# Patient Record
Sex: FEMALE | Race: WHITE | Employment: FULL TIME | URBAN - NONMETROPOLITAN AREA
[De-identification: names, ages, dates, MRNs, and addresses within clinical notes are randomized per-mention and may not be internally consistent; named-entity substitution may affect disease eponyms.]

---

## 2019-12-07 ENCOUNTER — OFFICE VISIT (OUTPATIENT)
Dept: FAMILY MEDICINE CLINIC | Age: 59
End: 2019-12-07
Payer: COMMERCIAL

## 2019-12-07 VITALS
TEMPERATURE: 97.1 F | DIASTOLIC BLOOD PRESSURE: 80 MMHG | SYSTOLIC BLOOD PRESSURE: 134 MMHG | HEIGHT: 67 IN | OXYGEN SATURATION: 96 % | HEART RATE: 99 BPM | WEIGHT: 293 LBS | BODY MASS INDEX: 45.99 KG/M2

## 2019-12-07 DIAGNOSIS — J40 BRONCHITIS: ICD-10-CM

## 2019-12-07 DIAGNOSIS — J01.90 ACUTE SINUSITIS, RECURRENCE NOT SPECIFIED, UNSPECIFIED LOCATION: Primary | ICD-10-CM

## 2019-12-07 PROCEDURE — 99213 OFFICE O/P EST LOW 20 MIN: CPT | Performed by: FAMILY MEDICINE

## 2019-12-07 RX ORDER — ALBUTEROL SULFATE 90 UG/1
2 AEROSOL, METERED RESPIRATORY (INHALATION) 4 TIMES DAILY PRN
Qty: 1 INHALER | Refills: 0 | Status: SHIPPED | OUTPATIENT
Start: 2019-12-07

## 2019-12-07 RX ORDER — PREDNISONE 2.5 MG
TABLET ORAL
Refills: 0 | COMMUNITY
Start: 2019-09-16 | End: 2022-03-03

## 2019-12-07 RX ORDER — PREDNISONE 20 MG/1
TABLET ORAL
Qty: 15 TABLET | Refills: 0 | Status: SHIPPED | OUTPATIENT
Start: 2019-12-07 | End: 2022-03-03

## 2019-12-07 RX ORDER — CEFDINIR 300 MG/1
300 CAPSULE ORAL 2 TIMES DAILY
Qty: 20 CAPSULE | Refills: 0 | Status: SHIPPED | OUTPATIENT
Start: 2019-12-07 | End: 2019-12-17

## 2019-12-07 RX ORDER — BUSPIRONE HYDROCHLORIDE 10 MG/1
10 TABLET ORAL NIGHTLY
Refills: 0 | COMMUNITY
Start: 2019-09-18 | End: 2022-03-03

## 2021-10-19 ENCOUNTER — HOSPITAL ENCOUNTER (OUTPATIENT)
Dept: WOUND CARE | Age: 61
Discharge: HOME | End: 2021-10-19

## 2021-10-22 ENCOUNTER — TELEPHONE (OUTPATIENT)
Dept: BARIATRICS/WEIGHT MGMT | Age: 61
End: 2021-10-22

## 2021-10-22 NOTE — TELEPHONE ENCOUNTER
Pt had cx appt due to medical conditions going on, I called pt back.  She wants to make an appt but still has a new medical problem going on, pt to call back when ready to R/S

## 2021-11-24 ENCOUNTER — HOSPITAL ENCOUNTER (OUTPATIENT)
Age: 61
Discharge: HOME | End: 2021-11-26

## 2021-11-24 PROCEDURE — 87081 CULTURE SCREEN ONLY: CPT

## 2021-11-26 LAB — MRSA CULTURE ONLY: NORMAL

## 2022-03-03 ENCOUNTER — OFFICE VISIT (OUTPATIENT)
Dept: BARIATRICS/WEIGHT MGMT | Age: 62
End: 2022-03-03
Payer: COMMERCIAL

## 2022-03-03 VITALS
HEART RATE: 71 BPM | BODY MASS INDEX: 50.02 KG/M2 | TEMPERATURE: 97.2 F | DIASTOLIC BLOOD PRESSURE: 70 MMHG | WEIGHT: 293 LBS | HEIGHT: 64 IN | SYSTOLIC BLOOD PRESSURE: 128 MMHG

## 2022-03-03 DIAGNOSIS — R53.82 CHRONIC FATIGUE: Primary | ICD-10-CM

## 2022-03-03 DIAGNOSIS — E66.01 CLASS 3 SEVERE OBESITY DUE TO EXCESS CALORIES WITH SERIOUS COMORBIDITY AND BODY MASS INDEX (BMI) GREATER THAN OR EQUAL TO 70 IN ADULT (HCC): ICD-10-CM

## 2022-03-03 PROBLEM — E66.813 CLASS 3 SEVERE OBESITY DUE TO EXCESS CALORIES WITH SERIOUS COMORBIDITY AND BODY MASS INDEX (BMI) GREATER THAN OR EQUAL TO 70 IN ADULT: Status: ACTIVE | Noted: 2022-03-03

## 2022-03-03 PROCEDURE — 99202 OFFICE O/P NEW SF 15 MIN: CPT

## 2022-03-03 PROCEDURE — 99204 OFFICE O/P NEW MOD 45 MIN: CPT | Performed by: INTERNAL MEDICINE

## 2022-03-03 RX ORDER — FLUOXETINE HYDROCHLORIDE 20 MG/1
20 CAPSULE ORAL DAILY
COMMUNITY
Start: 2022-02-22 | End: 2022-07-18

## 2022-03-03 RX ORDER — PHENTERMINE HYDROCHLORIDE 37.5 MG/1
37.5 TABLET ORAL
Qty: 30 TABLET | Refills: 0 | Status: SHIPPED | OUTPATIENT
Start: 2022-03-03 | End: 2022-03-24 | Stop reason: ALTCHOICE

## 2022-03-03 RX ORDER — ALBUTEROL SULFATE 2.5 MG/3ML
2.5 SOLUTION RESPIRATORY (INHALATION) DAILY
COMMUNITY
Start: 2022-01-06

## 2022-03-03 RX ORDER — PRAZOSIN HYDROCHLORIDE 1 MG/1
CAPSULE ORAL NIGHTLY
COMMUNITY
Start: 2021-12-27 | End: 2022-03-24 | Stop reason: ALTCHOICE

## 2022-03-03 RX ORDER — CLOTRIMAZOLE 1 %
CREAM (GRAM) TOPICAL PRN
COMMUNITY
Start: 2021-12-02 | End: 2022-07-18

## 2022-03-03 RX ORDER — OMEPRAZOLE 10 MG/1
CAPSULE, DELAYED RELEASE ORAL PRN
COMMUNITY
Start: 2021-11-24 | End: 2022-10-31 | Stop reason: DRUGHIGH

## 2022-03-03 NOTE — PATIENT INSTRUCTIONS
Protein: >65 gm/day. Fiber: >=25 gm/day. Water: >96 oz/day. Breakfast -     one high protein shake                            + 20 grams of fiber. Do this by either eating 12 tablespoons of the original, plain Fiber One cereal every day or 4 tablespoons of wheat dextrin powder (Benefiber or a generic brand), or fiber gummies (5 g on 2 gummies) 8 gummies every day. Work up to this amount slowly by starting with only one-eighth to one-fourth of the target amount and then adding another one-eighth to one-fourth every one or two weeks until reaching the target. Lunch -           one high protein shake                           + one a fat snack item     Dinner -          one frozen meal                           + one snack item     Shake options (<200 diya, >25 grams/protein) :  Nectar, Pure Protein, Premier, Boost Max, Ensure Max, BeneProtein and [employer] (which is lactose-free) are milk-based options; Nectar, Premier Protein Clear, IsoPure Protein Drink, and Protein 2 O are water-based options; (Premier Protein Clear, the water-based option, comes in a 20 oz bottle with 20 grams of protein and 90 calories. So you have to drink three each day which increases the cost.)  (Disclaimer: Dietary supplements rarely have their listed ingredients and the amount of each verified by a third party other. Sometimes they give verification for their claims to be GMO and gluten free and to be organic.  However, even such verifications as these may still be untrustworthy.)     Fat snack options (<150 diya, >11 grams of fat): 22 almonds, 1 1/2 tablespoon of a oil-based dressing or 4 tablespoons of [place] dressing on a bed of salad greens, 1 1/2 tablespoons of peanut butter, 1 Cranberry [place] Geliyoo options (<100 diya, no sweets): fruit, low fat/high protein [place] yogurt, mozzarella cheese stick, nuts, salad with dressing, peanut butter, chips/crackers/pretzels     Frozen meal options (<350 diya):  Weight Watchers Smart Ones, [employer] Cuisine, Healthy Choice, Vernell's, Estefany's     Food substitutes for the shakes:  4 oz of baked, grilled or broiled chicken, turkey or fish, 10 egg whites     Take a multivitamin daily. Walk 30 min every day or equivalent (210 min/week). Phentermine:  Take phentermine 37.5 mg, one-half to one tablet daily as needed for appetite suppression. Take each dose 30-90 min before effect will be needed. While taking phentermine, check the Blood Pressure every morning and every evening. If the systolic BP is >414 mmHg, the diastolic BP is >30 mm/Hg or the heart rate is > 100 beats per minute, do not take phentermine that day. If the systolic BP is consistently >155 mmHg, the diastolic BP is consistently above 90 mm/Hg or the heart rate is consistently > 100 beats per minute, then stop taking phentermine altogether. If the systolic BP >860 mmHg or the diastolic BP is >928 mmHg (even if it is only once), then phentermine should be stopped altogether without proving that any of these are consistently elevated. Follow up in 1 month.

## 2022-03-03 NOTE — PROGRESS NOTES
CC -   Referred for: HLD, obesity, tiredness. BACKGROUND -     First visit: 3/3/22      Obesity (all weight in lbs)  Began after college  Initial BMI 72.73, Wt 420.4, Ht 63.75\"  HS Grad wt patient unsure (was normal weight)   Lowest   wt 155 (30s)   Highest  wt 420.4  Pattern of wt gain: gradual  Wt change past yr: about the same  Most wt lost: 65 lbs (2017-18)  Other diets attempted: modified fast protein sparing diet ([facility])    Desire to lose weight: 10/10    Initial Diet:    Number of meals per day - 1 (dinner)    Number of snacks per day - 4    Meal volume - 9-10\" plate,  occasional seconds    Fast food/convenience store - 2x/week    Restaurants (not fast food) - 1x/week   Sweets - 0d/week (occasional cake)   Chips - 0d/week   Crackers/pretzels - 0d/week   Nuts - 0d/week   Peanut Butter - 0-1d/week   Popcorn - 0d/week   Dried fruit - 0d/week   Whole fruit - 5d/week (about 5 fruits a week)   Breakfast cereal - 0d/week   Granola/Protein/Energy bar - 0d/week   Sugar sweetened beverages - water mainly   Protein - No supplements   Fiber - No supplements     Initial Exercise:    Gym membership - no    Walking - no    Running - no    Resistance - no    Aerobic class - no  Has swim spa, and can walk against the current (currently has ) - will start once wound heals. Initial Sleep: Bedtime: 10pm-2am, wake up time: 1.5 hrs of sleep, and then wakes up and then 1-2 hrs, daytime naps: no. Has CPAP machine that was recalled but has not had replaced. Weight scale at home: yes, takes weight: daily  Food scale: yes.  ______________________    [facility] -  Past Medical History:   Diagnosis Date    Arthritis     Asthma     RENETTA (generalized anxiety disorder)     Meningitis due to viruses     Pancreatitis    Pancreatitis was a reaction to synvisc injected in right knee.     Past Surgical History:   Procedure Laterality Date    APPENDECTOMY      CHOLECYSTECTOMY      [facility]    [facility] CURETTAGE OF UTERUS      [facility]    ENDOSCOPY, COLON, DIAGNOSTIC      [facility]  [facility]/12/2017    [facility]    SKIN BIOPSY      breast   Pilonidal cyst removal (had MRSA)    Prior to Admission medications    Medication Sig Start Date End Date Taking? Authorizing Provider   predniSONE (DELTASONE) 2.5 MG tablet  9/16/19   Historical Provider, MD   busPIRone (BUSPAR) 10 MG tablet Take 10 mg by mouth nightly  9/18/19   Historical Provider, MD   predniSONE (DELTASONE) 20 MG tablet 1 po bid x 5 days then 1 po daily x 5 days 12/7/19   Axel Pascual MD   albuterol sulfate  (90 Base) MCG/ACT inhaler Inhale 2 puffs into the lungs 4 times daily as needed for Wheezing 12/7/19   Axel Pascual MD   acetaminophen (TYLENOL) 325 MG tablet Take 650 mg by mouth every 6 hours as needed for Pain    Historical Provider, MD   Ascorbic Acid (VITAMIN C) 500 MG tablet Take 1,000 mg by mouth daily    Historical Provider, MD   vitamin B-12 (CYANOCOBALAMIN) 100 MCG tablet Take 50 mcg by mouth daily    Historical Provider, MD   Multiple Vitamins-Minerals (THERAPEUTIC MULTIVITAMIN-MINERALS) tablet Take 1 tablet by mouth daily    Historical Provider, MD   Also takes vitamin D and E. Senokot-s for constipation (also benefiber). Probiotic(peptiva) daily. Family history: DM: no, Heart disease: father, MGM. Allergies: Allergies   Allergen Reactions    Latex     Adhesive Tape     Gabapentin Diarrhea     constant diarrhea    Pcn [Penicillins]     Sulfa Antibiotics      Social history: smoking: never ; Alcohol: rarely. ROS -  Card - no CP, but AMOS on exertion. GI - no N/V/D, has constipation.     PE -  Gen : /70 (Site: Left Lower Arm, Position: Sitting, Cuff Size: Medium Adult)   Pulse 71   Temp 97.2 °F (36.2 °C) (Temporal)   Ht 5' 3.75\" (1.619 m)   Wt (!) 420 lb 6.4 oz (190.7 kg)   BMI 72.73 kg/m²       WN, WD, NAD  Lung: Nml resp effort, CTA B/L  Heart:  RRR w/ 1/6 SM in left sternal border, no LE pitting edema b/l  Psych: Normal mood   Full affect  Neuro: Moves all ext well  ______________________    HISTORY & ASSESSMENT/PLAN -     Problem 1  - Fatigue   HPI   - Ongoing, gradually progressing which pt feels like due to weight gain  Assessment  - Uncontrolled   Plan   - Has CALOS and waiting for CPAP changed (prior one had recall). Will also get recent bloodwork result from Dr. Marsha Alston office. Weight reduction can help. Weight reduction per plan below    Problem 2  - Obesity   HPI   - See above Background for description    Weight  Date    420.4  3/3/22  DEN = 2027 Kevin/d = 78579 Kevin/wk  Wt effect of HR foods = 700 Kevin/wk = 100 Kevin/d= 5% DEN = 10 lb/year. Assessment  - Uncontrolled  Plan   - Talked about various options. Does not want surgery at this time. Would like to try VLCD. Would like an appetite suppressant, and after talking about options and side effects, opted for Phentermine. Planned as follows:  Patient Instructions   Protein: >65 gm/day. Fiber: >=25 gm/day. Water: >96 oz/day. Breakfast -     one high protein shake                            + 20 grams of fiber. Do this by either eating 12 tablespoons of the original, plain Fiber One cereal every day or 4 tablespoons of wheat dextrin powder (Benefiber or a generic brand), or fiber gummies (5 g on 2 gummies) 8 gummies every day. Work up to this amount slowly by starting with only one-eighth to one-fourth of the target amount and then adding another one-eighth to one-fourth every one or two weeks until reaching the target. Lunch -           one high protein shake                           + one a fat snack item     Dinner -          one frozen meal                           + one snack item     Shake options (<200 kevin, >25 grams/protein) :  Nectar, Pure Protein, Premier, Boost Max, Ensure Max, BeneProtein and [employer] (which is lactose-free) are milk-based options;  Nectar, Premier Protein Clear, IsoPure Protein Drink, and Protein 2 O are water-based options; (Premier Protein Clear, the water-based option, comes in a 20 oz bottle with 20 grams of protein and 90 calories. So you have to drink three each day which increases the cost.)  (Disclaimer: Dietary supplements rarely have their listed ingredients and the amount of each verified by a third party other. Sometimes they give verification for their claims to be GMO and gluten free and to be organic. However, even such verifications as these may still be untrustworthy.)     Fat snack options (<150 diya, >11 grams of fat): 22 almonds, 1 1/2 tablespoon of a oil-based dressing or 4 tablespoons of [place] dressing on a bed of salad greens, 1 1/2 tablespoons of peanut butter, 1 Cranberry [place] MMIS options (<100 diya, no sweets): fruit, low fat/high protein [place] yogurt, mozzarella cheese stick, nuts, salad with dressing, peanut butter, chips/crackers/pretzels     Frozen meal options (<350 diya):  Weight Watchers Smart Ones, [employer] Cuisine, Healthy Choice, Vernell's, Estefany's     Food substitutes for the shakes:  4 oz of baked, grilled or broiled chicken, turkey or fish, 10 egg whites     Take a multivitamin daily. Walk 30 min every day or equivalent (210 min/week). Phentermine:  Take phentermine 37.5 mg, one-half to one tablet daily as needed for appetite suppression. Take each dose 30-90 min before effect will be needed. While taking phentermine, check the Blood Pressure every morning and every evening. If the systolic BP is >516 mmHg, the diastolic BP is >89 mm/Hg or the heart rate is > 100 beats per minute, do not take phentermine that day. If the systolic BP is consistently >155 mmHg, the diastolic BP is consistently above 90 mm/Hg or the heart rate is consistently > 100 beats per minute, then stop taking phentermine altogether.     If the systolic BP >315 mmHg or the diastolic BP is >375 mmHg (even if it is only once), then phentermine should be stopped altogether without proving that any of these are consistently elevated. Problem 3  - Hyperlipidemia   HPI   - We do not have recent bloodwork, but patient had one within the last 60 days, asymptomatic otherwise, not on statin. Assessment  - Unsure if controlled. Plan   - will get labwork from Dr. Jim Ortiz office. Orders Placed This Encounter   Medications    phentermine (ADIPEX-P) 37.5 MG tablet     Sig: Take 1 tablet by mouth every morning (before breakfast) for 30 days. Dispense:  30 tablet     Refill:  0       Total time spent on encounter: 58 min. Nathaniel Jonas MD  Internal Medicine/Obesity Medicine  3/3/2022.

## 2022-06-01 ENCOUNTER — TELEPHONE (OUTPATIENT)
Dept: BARIATRICS/WEIGHT MGMT | Age: 62
End: 2022-06-01

## 2022-06-01 NOTE — TELEPHONE ENCOUNTER
Called pt about missed appt. PT stated didn't know.  Asked if wanted to reschedule stated dealing with Covid edu patient to call to reschedule when wants to

## 2022-07-07 ENCOUNTER — TELEPHONE (OUTPATIENT)
Dept: ADMINISTRATIVE | Age: 62
End: 2022-07-07

## 2022-07-07 NOTE — TELEPHONE ENCOUNTER
Pt had tubes placed in the [facility] in 2018. She said recently a small piece of the tube came out of her ear. She since then has been experiencing ear pain and dizziness. She is scheduled for the first available appt. Please reschedule accordingly.

## 2022-07-07 NOTE — TELEPHONE ENCOUNTER
Called Pt. To see if she knew who put right ear tube in, pt. States she does not remember who put tube in. Pt. Scheduled 8/3/22.

## 2022-07-13 ENCOUNTER — FOLLOWUP TELEPHONE ENCOUNTER (OUTPATIENT)
Dept: AUDIOLOGY | Age: 62
End: 2022-07-13

## 2022-07-13 NOTE — PROGRESS NOTES
AUDIOLOGY SERVICES   VESTIBULAR TESTING INSTRUCTIONS  Appointment on Thursday, August 11th at 2:30 pm  ARRIVE at 2:00 pm to register in the [address] [place] , [facility]: [phone]: *Stay on the 1st floor after registration*  Your doctor has ordered a Vestibular Test to evaluate your balance system (Videonystagmography/ VNG). Several separate parts may be performed and all are simple and not unpleasant. The balance system of the ear and the brain are evaluated by recording eye movement in response to changes in head position, view moving objects, motion of the body, and thermal stimuli (warm and cold) applied to the ear canal. Vestibular testing is about 1-1 ½ hours in duration. PATIENT RESPONSIBILITY:   1. Try to get a full nights sleep before the test.   2. Remove contact lenses before the exam. Inform the audiologist of any vision problems or lens implants. 3. Wear comfortable clothing and flat or low-heeled shoes. 4. Eat a light meal 2 to 4 hours before the test.   5. DO NOT wear eye makeup or mascara. 6. Avoid beverages containing caffeine (coffee, tea or soft drinks) the day of the test.   7. Please bring a list of your current medications. 8. Please continue to take any medications which are necessary to your long-term and immediate health. Any heart, blood pressure, blood thinning, diabetes or epilepsy medications should be taken as usual.   FOR 48 HOURS PRIOR TO THE TEST, DO NOT TAKE:   1. Anti-dizziness medications: Meclizine, Antivert, Dramamine, Bonine, Phenergan, Compazine, Transderm Scopolamine. 2. Alcohol:  beer, whiskey, clear alcohols, helena and liquid medicines containing alcohol (cough syrup)   We would prefer that you not take the following medications. If any cannot be discontinued, please let the audiologist know.    1. Tranquilizers: Valium, Diazepam, Librium, Elavil, Triavil, Klonopin, Xanax, Ativan, etc.   2. Sleeping Pills: Seconal, Ambien, Nembutal, etc.   3. Antidepressants: Paxil, Prozac, Zoloft, etc.    4. Pain Medications: Codeine, Demerol, Percodan, Hydrocodone, etc.   5. Antihistamine/ Decongestant: Dimetapp, Benadryl, Sudafed, Allegra, Claritin, etc.   Medication can be resumed immediately following VNG evaluation. YOUR COOPERATION WITH THESE INSTRUCTIONS WILL IMPROVE THE QUALITY OF YOUR EXAMINATION. IF YOU ENCOUNTER ANY PROBLEMS FOLLOWING THESE INSTRUCTIONS, OR FOR ANY OTHER QUESTIONS PERTAINING TO THIS TEST PROCEDURE, Mandi Ortiz [address] [place] at [phone] or [facility] at [phone]. You may wish to check for testing coverage with your insurance.  Below are the codes that will be billed following testing.  39032 Caloric vestibular test with 4 irrigations   19811 ENG Complete test

## 2022-08-03 ENCOUNTER — OFFICE VISIT (OUTPATIENT)
Dept: ENT CLINIC | Age: 62
End: 2022-08-03
Payer: COMMERCIAL

## 2022-08-03 ENCOUNTER — PROCEDURE VISIT (OUTPATIENT)
Dept: AUDIOLOGY | Age: 62
End: 2022-08-03
Payer: COMMERCIAL

## 2022-08-03 VITALS
BODY MASS INDEX: 51.91 KG/M2 | SYSTOLIC BLOOD PRESSURE: 142 MMHG | HEART RATE: 80 BPM | DIASTOLIC BLOOD PRESSURE: 83 MMHG | HEIGHT: 63 IN | WEIGHT: 293 LBS

## 2022-08-03 DIAGNOSIS — H90.41 SENSORINEURAL HEARING LOSS (SNHL) OF RIGHT EAR WITH UNRESTRICTED HEARING OF LEFT EAR: Primary | ICD-10-CM

## 2022-08-03 DIAGNOSIS — Z96.22 RETAINED MYRINGOTOMY TUBE: ICD-10-CM

## 2022-08-03 DIAGNOSIS — R42 VERTIGO: Primary | ICD-10-CM

## 2022-08-03 DIAGNOSIS — R42 DIZZINESS: ICD-10-CM

## 2022-08-03 PROCEDURE — 92567 TYMPANOMETRY: CPT | Performed by: AUDIOLOGIST

## 2022-08-03 PROCEDURE — 92557 COMPREHENSIVE HEARING TEST: CPT | Performed by: AUDIOLOGIST

## 2022-08-03 PROCEDURE — 99203 OFFICE O/P NEW LOW 30 MIN: CPT | Performed by: NURSE PRACTITIONER

## 2022-08-03 ASSESSMENT — ENCOUNTER SYMPTOMS
DIARRHEA: 0
SHORTNESS OF BREATH: 0
VOMITING: 0
RHINORRHEA: 0
COUGH: 0
SORE THROAT: 0
EYE PAIN: 0
ALLERGIC/IMMUNOLOGIC NEGATIVE: 1
BACK PAIN: 0
SINUS PRESSURE: 0
EYE DISCHARGE: 0

## 2022-08-03 NOTE — PROGRESS NOTES
Subjective:      Patient ID:  Elina Rodriguez is a 59 y.o. female. HPI:    Patient presents today with a severe problem of the right ear. It started 2 months ago. Patient states that nothing makes it better and  motion  makes it worse. Pain: yes   Side: right  Drainage:  no   Side: bilateral   Trauma history to the ear:    Side: bilateral   Desc:  Hyperbaric chamber caused bleeding and pain to the ear. Hearing Aids: no      History of surgery to the head/neck: no   Description: none  History of cerumen impaction: no  History of noise exposure: no   Type: none  Spinning: yes with position changes   Length of time: seconds  Hearing loss: yes    Fluctuating: no  Aural pressure: yes  Tinnitus: no  Otalgia: yes    In 2017 she was in a hyperbaric chamber and had severe pain and bleeding form the right ear. Presented to [facility] and saw an ENT. A few days later he placed in the right ear. Last summer she suspected the tube was out. 4 months ago she started having a dull ache of the right ear. No treatment given. 2 months ago she started having room spinning  vertigo. She presented to the ER at that time. They Did a CT which was negative. PCP referred  her to Dr. Kobi Chavarria. Dr. Kobi Chavarria referred her to us for a retrained PE tube behind the right TM.      Past Medical History:   Diagnosis Date    Arthritis     Asthma     Chronic fatigue     Class 3 severe obesity due to excess calories with serious comorbidity and body mass index (BMI) greater than or equal to 70 in [facility])     RENETTA (generalized anxiety disorder)     Meningitis due to viruses     Pancreatitis     Sleep apnea      Past Surgical History:   Procedure Laterality Date    APPENDECTOMY      CHOLECYSTECTOMY      [facility]    DILATION AND CURETTAGE OF UTERUS      [facility]    ENDOSCOPY, COLON, DIAGNOSTIC      HYSTEROSCOPY  01/12/2017    [facility]    PILONIDAL CYST EXCISION N/A     SKIN BIOPSY      breast     Family Turbinates: Swollen. Not pale. Mouth/Throat:      Lips: Pink. Mouth: Mucous membranes are moist.      Pharynx: Oropharynx is clear. Eyes:      General: Lids are normal.      Conjunctiva/sclera: Conjunctivae normal.      Pupils: Pupils are equal, round, and reactive to light. Cardiovascular:      Rate and Rhythm: Normal rate and regular rhythm. Pulses: Normal pulses. Pulmonary:      Effort: Pulmonary effort is normal. No respiratory distress. Breath sounds: No stridor. Abdominal:      General: Abdomen is flat. Palpations: Abdomen is soft. Musculoskeletal:         General: Normal range of motion. Cervical back: Normal range of motion. No rigidity. Skin:     General: Skin is warm and dry. Neurological:      General: No focal deficit present. Mental Status: She is alert and oriented to person, place, and time. Psychiatric:         Attention and Perception: Attention normal.         Mood and Affect: Affect normal.         Behavior: Behavior normal. Behavior is cooperative. Thought Content: Thought content normal.         Judgment: Judgment normal.     Right TM: retained tube behind TM        AUDIO/TYMP:          Audiogram and tympanogram reviewed with patient. Audiogram reveals 25 dB hearing loss in the right ear with 100% discrimination at 55 dB, 20 dB of hearing loss in the left ear with 100% discrimination at 55 dB. Audiogram is asymmetrical on right. Tympanogram reveals type A curve in the right ear, with type A curve in the left ear. Marcial hallpike:  LEFT: (positive)   RIGHT: (negative)    Epley was performed on the left x 2     Recheck was negative after 2 manuvers, however, the patient did still exhibit mild disequilibrium. Assessment:       Diagnosis Orders   1. Retained myringotomy tube        2. Vertigo                   Plan:      Was seen and examined today for complaints of vertigo and possible retained tympanostomy tube.   Upon examination the patient was noted to have a retained foreign body behind the right TM. The images above was reviewed with Dr. Michael Pinedo who confirmed that there is a retained tympanostomy tube behind the right TM. Discussed with patient that without considerable issues caused by the retained tube no further intervention would be needed due to the possible risks versus reward of the procedure. As noted above a Marcial-Hallpike procedure was performed and was positive to the left side. Epley maneuver was performed x2 to the left side with resolution of nausea and room spinning. The patient did state that she continued to have  mild disequilibrium following the manuevers. As noted above the Marcial-Hallpike procedure was negative to the right side. The patient will be seen in 1 week for reevaluation of her BPPV symptoms. At that time we will evaluate necessity of previously scheduled VNG. Leonila Helm agrees to this plan will follow up in 1 week. She was instructed to call for any new or worsening symptoms prior to her next appointment. Valery Love CNP am scribing for and in the presence of Barbara Thrasher CNP 4:04 PM 08/03/22      Attestation:     Jacqueline Gutierrez CNP, personally performed the services described in this documentation as scribed by Isabela Pizarro CNP in my presence, and it is both accurate and complete.              Barbara Thrasher, GORDON, FNP-C  [address] [facility], Nose and Throat    The information contained in this note has been dictated using drug and medical speech recognition software and may contain errors

## 2022-08-03 NOTE — PROGRESS NOTES
This patient was referred for audiometric/tympanometric testing by DANETTE Serna due to  dizziness and history of right ear PE tube . Audiometry using pure tone air and bone conduction testing revealed an essentially mild  sensorineural hearing loss, in the right ear and hearing sensitivity within normal limits through frequency range, in the left ear. Reliability was good. Speech reception thresholds were in good agreement with the pure tone averages, bilaterally. Speech discrimination scores were excellent, bilaterally. Tympanometry revealed normal middle ear peak pressure and compliance, bilaterally. The results were reviewed with the patient. Recommendations for follow up will be made pending physician consult.     Sharon Gutierrez [facility]  [address] [facility] [id number]  Electronically signed by Sharon Gutierrez on 8/3/2022 at 5:17 PM

## 2022-08-10 ENCOUNTER — FOLLOWUP TELEPHONE ENCOUNTER (OUTPATIENT)
Dept: AUDIOLOGY | Age: 62
End: 2022-08-10

## 2022-08-11 ENCOUNTER — HOSPITAL ENCOUNTER (OUTPATIENT)
Dept: AUDIOLOGY | Age: 62
Discharge: HOME | End: 2022-08-11
Payer: COMMERCIAL

## 2022-08-11 PROCEDURE — 92540 BASIC VESTIBULAR EVALUATION: CPT | Performed by: AUDIOLOGIST

## 2022-08-11 PROCEDURE — 92537 CALORIC VSTBLR TEST W/REC: CPT | Performed by: AUDIOLOGIST

## 2022-08-11 NOTE — PROGRESS NOTES
VNG EVALUATION    REASON FOR REFERRAL:  This patient was referred for VNG testing by Trisha Aponte MD due to dizziness. Has had dizziness in the past but in June she had an attack and got put of bed and fell. Room spinning. Been unable to drive since attack because makes her sick. Rapid head movement instantly brings on symptoms. Believes depth perception is off somewhat. RESULTS:  Bithermal caloric irrigations revealed appropriate beating nystagmus with no unilateral weakness. Directional preponderance and fixation suppression were within normal limits. No irregular eye movements were recorded during saccades, pendular tracking, or optokinetic testing. No significant nystagmus was recorded during gaze or positional testing with fixation. Significant nystagmus was recorded during gaze and positional testing without fixation. Some up beating nystagmus was recorded. During all gaze and positional testing constant eye lid fluttering was recorded/ noticed. IMPRESSION:  VNG test results indicates vestibular function within normal limits bilaterally. Oculomotor test results were within normal limits. Nystagmus without fixation suggests peripheral vestibular system involvement. The presence of up beating nystagmus suggests central nervous system involvement. Please note:    During all gaze and positional testing constant eye lid fluttering was recorded/ noticed. If I can be of further assistance or provide additional information, please do not hesitate to contact this office.     Thank you for the referral.      __________________________________  Sharon Jalloh/CCC-A  [place] Lic # [id number]

## 2022-08-17 ENCOUNTER — TELEPHONE (OUTPATIENT)
Dept: ENT CLINIC | Age: 62
End: 2022-08-17

## 2022-08-17 NOTE — TELEPHONE ENCOUNTER
LOV 8/3/22 Naila Laguna. Patient requesting to seen Dr Eric Richardson. Patient scheduled 10/27/22. Patient had VNG 8/11/22 [facility], for ear pain and dizziness. If able to be seen sooner, please notify patient [phone].

## 2022-10-27 ENCOUNTER — PREP FOR PROCEDURE (OUTPATIENT)
Dept: ENT CLINIC | Age: 62
End: 2022-10-27

## 2022-10-27 ENCOUNTER — OFFICE VISIT (OUTPATIENT)
Dept: ENT CLINIC | Age: 62
End: 2022-10-27
Payer: COMMERCIAL

## 2022-10-27 ENCOUNTER — TELEPHONE (OUTPATIENT)
Dept: ENT CLINIC | Age: 62
End: 2022-10-27

## 2022-10-27 VITALS
HEIGHT: 62 IN | DIASTOLIC BLOOD PRESSURE: 85 MMHG | WEIGHT: 293 LBS | SYSTOLIC BLOOD PRESSURE: 143 MMHG | BODY MASS INDEX: 53.92 KG/M2 | HEART RATE: 83 BPM

## 2022-10-27 DIAGNOSIS — R42 VERTIGO: Primary | ICD-10-CM

## 2022-10-27 DIAGNOSIS — Z96.22 RETAINED MYRINGOTOMY TUBE: ICD-10-CM

## 2022-10-27 DIAGNOSIS — Z96.22 RETAINED MYRINGOTOMY TUBE: Primary | ICD-10-CM

## 2022-10-27 PROCEDURE — 99213 OFFICE O/P EST LOW 20 MIN: CPT | Performed by: OTOLARYNGOLOGY

## 2022-10-27 ASSESSMENT — ENCOUNTER SYMPTOMS
GASTROINTESTINAL NEGATIVE: 1
VOMITING: 0
EYE PAIN: 0
APNEA: 0
DIARRHEA: 0
RESPIRATORY NEGATIVE: 1
ABDOMINAL PAIN: 0
EYES NEGATIVE: 1
COLOR CHANGE: 0
SHORTNESS OF BREATH: 0
CHEST TIGHTNESS: 0
EYE DISCHARGE: 0

## 2022-10-27 NOTE — PATIENT INSTRUCTIONS
Thank you for choosing our [place] [id number] or [facility]  [facility].N.T. practice. We are committed to your medical treatment and  care. If you need to reschedule or cancel your surgery or follow up  appointment, please call the surgery scheduler at [phone]. INSTRUCTIONS FOR SURGERY Tube Removal Patch Right Ear 11/2/2022    Nothing to eat or drink after midnight the night before surgery unless surgery is at [facility] or otherwise instructed by the hospital.    DO NOT TAKE ANY ASPIRIN PRODUCTS 7 days prior to surgery-unless required by your cardiologist or primary care physician. Tylenol only. No Advil, Motrin, Aleve, or Ibuprofen    Any illegal drugs in your system (including Marijuana even if legally prescribed) will result in your surgery being cancelled. Please be sure to check with our office or the hospital on time frame for the drugs to be out of your system. Should your insurance change at any time you must contact our office. Failure to do so may result in your surgery being rescheduled. If you need paperwork filled out for work, you must give the office 2 weeks to complete and submit the forms.       [address], [address] will call you a couple days prior to your surgery and give you further instructions, if any questions call them at [phone]

## 2022-10-27 NOTE — TELEPHONE ENCOUNTER
Prior Authorization Form:      DEMOGRAPHICS:                     Patient Name:  Maxi Walters  Patient :  1960            Insurance:  Payor: Chadd Joseph / Plan: Stefanie Curling JEET / Product Type: *No Product type* /   Insurance ID Number:    Payer/Plan Subscr  Sex Relation Sub.  Ins. ID Effective Group Num   1. SANDY - BUC* ANTOLIN BHATT 1960 Female Self [id number]                                    [address]         DIAGNOSIS & PROCEDURE:                       Procedure/Operation: Right Tube Removal Patch            CPT Code: 78113,02741    Diagnosis:  retained ear tube     ICD10 Code: Z96.66    Location:  [facility]    Surgeon:  bjorn    SCHEDULING INFORMATION:                          Date: 2022    Time: n/a              Anesthesia:  General                                                       Status:  Outpatient        Special Comments:  include all chart notes        Electronically signed by Adia Balbuena MA on 10/27/2022 at 3:38 PM

## 2022-10-27 NOTE — PROGRESS NOTES
Subjective:      Patient ID:  Blade Blanton is a 57 y.o. female. HPI:    Patient presents today for vertigo and ear aches. Condition has been present for 5 month(s). Pt had tube in ear in 2018 by Dr. Becca Brooks. Adrianne Mcdaniels saw tube behind right eardrum and sent to me. Pt had been taking astelin but did not see a difference. Past Medical History:   Diagnosis Date    Arthritis     Asthma     Chronic fatigue     Class 3 severe obesity due to excess calories with serious comorbidity and body mass index (BMI) greater than or equal to 70 in [facility])     RENETTA (generalized anxiety disorder)     Meningitis due to viruses     Pancreatitis     Sleep apnea      Past Surgical History:   Procedure Laterality Date    APPENDECTOMY      CHOLECYSTECTOMY      [facility]    [facility]TER[facility]    ENDOSCOPY, COLON, DIAGNOSTIC      [facility]STEROSCOPY  [facility]12/2017    [facility]    PILONIDAL CYST EXCISION N/A     SKIN BIOPSY      breast     Family History   Problem Relation Age of Onset    Hypertension Mother     Stroke Mother     Cancer Father         throat, colon, stomach    Heart Disease Father     Atrial Fibrillation Father     Hypertension Sister     Prostate Cancer Brother      Social History     Socioeconomic History    Marital status: Single     Spouse name: None    Number of children: None    Years of education: None    Highest education level: None   Occupational History    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 Negative for appetite change. HENT:  Negative for congestion and ear discharge.     Eyes: Negative. Negative for pain, discharge and visual disturbance. Respiratory: Negative. Negative for apnea, chest tightness and shortness of breath. Cardiovascular: Negative. Negative for chest pain, palpitations and leg swelling. Gastrointestinal: Negative. Negative for abdominal pain, diarrhea and vomiting. Endocrine: Negative for cold intolerance, heat intolerance and polydipsia. Genitourinary: Negative. Negative for dysuria, flank pain and hematuria. Musculoskeletal: Negative. Negative for arthralgias, gait problem and neck pain. Skin: Negative. Negative for color change, pallor and rash. Allergic/Immunologic: Negative for environmental allergies, food allergies and immunocompromised state. Neurological:  Positive for dizziness. Negative for numbness and headaches. Hematological:  Negative for adenopathy. Psychiatric/Behavioral: Negative. Negative for behavioral problems and hallucinations. All other systems reviewed and are negative. Objective:     Vitals:    10/27/22 1256   BP: (!) 143/85   Pulse: 83     Physical Exam  Vitals and nursing note reviewed. Constitutional:       Appearance: She is well-developed. HENT:      Head: Normocephalic and atraumatic. Right Ear: A PE tube is present. Ears:      Comments: There is a tube behind the right TM     Nose: Nose normal.      Mouth/Throat:      Pharynx: Uvula midline. Eyes:      Conjunctiva/sclera: Conjunctivae normal.      Pupils: Pupils are equal, round, and reactive to light. Cardiovascular:      Rate and Rhythm: Normal rate and regular rhythm. Heart sounds: Normal heart sounds. Pulmonary:      Effort: Pulmonary effort is normal.      Breath sounds: Normal breath sounds. Abdominal:      General: Bowel sounds are normal.      Palpations: Abdomen is soft. Musculoskeletal:      Cervical back: Normal range of motion and neck supple. Skin:     General: Skin is warm and dry.    Neurological: Mental Status: She is alert and oriented to person, place, and time. Assessment:       Diagnosis Orders   1. Vertigo        2. Retained myringotomy tube                   Plan:      I recommend:    right myringotomy tube removal and patch  The procedure risks and benefits were discussed with the patient and family including prolonged perforation, otalgia and mild hemmorhage. Pt and family understood and decided to proceed with the surgery.     Follow up in 1 week(s)

## 2022-11-01 ENCOUNTER — TELEPHONE (OUTPATIENT)
Dept: ENT CLINIC | Age: 62
End: 2022-11-01

## 2022-11-01 NOTE — TELEPHONE ENCOUNTER
Patient LVM stating she has been waiting for a return call regarding surgery tomorrow with Dr. Petra Martell. Please advise.     Electronically signed by Sil Morrison MA on 11/1/22 at 11:04 AM RUPERTO

## 2022-11-17 ENCOUNTER — TELEPHONE (OUTPATIENT)
Dept: NON INVASIVE DIAGNOSTICS | Age: 62
End: 2022-11-17

## 2022-12-14 ENCOUNTER — TELEPHONE (OUTPATIENT)
Dept: NEUROLOGY | Age: 62
End: 2022-12-14

## 2022-12-14 ENCOUNTER — OFFICE VISIT (OUTPATIENT)
Dept: NEUROLOGY | Age: 62
End: 2022-12-14
Payer: COMMERCIAL

## 2022-12-14 VITALS
TEMPERATURE: 97.3 F | WEIGHT: 293 LBS | OXYGEN SATURATION: 92 % | HEART RATE: 73 BPM | DIASTOLIC BLOOD PRESSURE: 98 MMHG | BODY MASS INDEX: 47.09 KG/M2 | HEIGHT: 66 IN | SYSTOLIC BLOOD PRESSURE: 155 MMHG

## 2022-12-14 DIAGNOSIS — R41.841 COGNITIVE COMMUNICATION DEFICIT: Primary | ICD-10-CM

## 2022-12-14 PROCEDURE — 99205 OFFICE O/P NEW HI 60 MIN: CPT | Performed by: PHYSICIAN ASSISTANT

## 2022-12-14 RX ORDER — DONEPEZIL HCL 5 MG
5 TABLET ORAL NIGHTLY
Qty: 30 TABLET | Refills: 0 | Status: SHIPPED | OUTPATIENT
Start: 2022-12-14

## 2022-12-14 NOTE — PROGRESS NOTES
[facility] Neurology   Office visit- new patient     Date:  12/14/2022  Patient Name:  Camilla Dominguez  YOB: 1960  MRN: [id number]     PCP:  Hussein Fernandez DO   Referring:  Pamela Altamirano DO      Chief Complaint: memory loss, dizziness     History obtained from: patient, chart     Assessment    Cognitive communication deficit Multifactorial. Symptoms started after COVID-19 infection- post covid brain fog is a recognized sequelae and she is likely experiencing difficulties related to this. Additionally, she reports that her CPAP machine needs titrated. If her CALOS is inadequately treated, this may be contributing. Underlying depression related to medical conditions also a contributing factor. Will need to check labs- B12, TSH, RPR. Formal cognitive evaluation. Neuropsych eval. MRI brain report was unrevealing, though I did not have images to review. Dizziness Again, multifactorial. Hx of chronic ear issue with tube placement in the R ear in 2018 that has since broke and needs to be removed (following with ENT), post-covid syndrome, Hx of suspected lumbar radiculopathy and evidence of decreased sensation in the BLE R > L. Plan  Patient to obtain copy of MRI brain images   Labs to be obtained, if not already checked- Check B12, TSH  Formal cognitive evaluation   Neuropsychological evaluation   Referral to [facility] for occupational driving assessment  Aricept 5 mg nightly- increase to 10 mg nightly after one month   Continue to follow with ENT  Continue PT for balance   Consider follow up with mental health provider for depression   RTO 4 weeks   Call with questions or concerns       History of Present Illness:  Camilla Dominguez is a 57 y.o. right handed female presenting for evaluation of memory loss and dizziness. Her problems began in June 2022 following her COVID infection from May.   She began having increasing difficulties with her balance and reported not being able to stand feeling like she was severe obesity due to excess calories with serious comorbidity and body mass index (BMI) greater than or equal to 70 in adult [facility])     COVID-19 05/2022    RENETTA (generalized anxiety disorder)     GERD (gastroesophageal reflux disease)     O2 dependent     CALOS on CPAP     Pancreatitis     Retained myringotomy tube in right ear         Surgical History:   Past Surgical History:   Procedure Laterality Date    APPENDECTOMY      CHOLECYSTECTOMY      [facility]    [facility]TER[facility]      [facility]    ENDOSCOPY, COLON, DIAGNOSTIC      [facility]STEROSCOPY  [facility]/12/2017    [facility]    MYRINGOTOMY AND TYMPANOSTOMY TUBE PLACEMENT      PILONIDAL CYST EXCISION N/A     SKIN BIOPSY      breast        Family History:   Family History   Problem Relation Age of Onset    Hypertension Mother     Stroke Mother     Cancer Father         throat, colon, stomach    Heart Disease Father     Atrial Fibrillation Father     Hypertension Sister     Prostate Cancer Brother      Dementia in grandfather     Social History:  Social History     Tobacco Use    Smoking status: Never    Smokeless tobacco: Never   Vaping Use    Vaping Use: Never used   Substance Use Topics    Alcohol use: Yes     Comment: rare    Drug use: No        Current Medications:      Current Outpatient Medications   Medication Sig Dispense Refill    OXYGEN Inhale into the lungs      Fluticasone-Umeclidin-Vilant (TRELEGY ELLIPTA) 200-62.5-25 MCG/INH AEPB Inhale 1 puff into the lungs daily (before lunch)      omeprazole (PRILOSEC) 40 MG delayed release capsule Take 40 mg by mouth as needed      clonazePAM (KLONOPIN) 2 MG tablet Take 2 mg by mouth every morning.       azelastine (ASTELIN) 0.1 % nasal spray 1 spray by Nasal route 2 times daily Use in each nostril as directed 60 mL 1    albuterol (PROVENTIL) (2.5 MG/3ML) 0.083% nebulizer solution Take 2.5 mg by nebulization daily      albuterol sulfate  (90 Base) MCG/ACT inhaler Inhale 2 puffs into the lungs 4 times daily as needed for Wheezing 1 Inhaler 0    Ascorbic Acid (VITAMIN C) 500 MG tablet Take 1,000 mg by mouth daily      Multiple Vitamins-Minerals (THERAPEUTIC MULTIVITAMIN-MINERALS) tablet Take 1 tablet by mouth daily       No current facility-administered medications for this visit. Allergies: Allergies   Allergen Reactions    Latex Anaphylaxis    Adhesive Tape      Tears skin off    Gabapentin Diarrhea     constant diarrhea    Pcn [Penicillins] Other (See Comments)     As child    Sulfa Antibiotics Diarrhea        Physical Examination  Vitals   Vitals:    12/14/22 0854   BP: (!) 155/98   Pulse: 73   Temp: 97.3 °F (36.3 °C)   TempSrc: Temporal   SpO2: 92%   Weight: (!) 434 lb (196.9 kg)   Height: 5' 6\" (1.676 m)        General: Patient appears in no acute distress. Obese   HEENT: Normocephalic, atraumatic  Chest: effort normal on nasal O2   Extremities/Peripheral vascular: Peripheral edema/swelling noted. No cold limbs noted. Neurologic Examination    Mental Status  Alert, and oriented to person, place and time. Speech is fluent with intact comprehension. Attention and concentration appeared normal. 25/30 on MOCA with 4/5 exceutive function, 5/6 attention, 2/3 language and 3/5 recall. Delayed thought processing. Cranial Nerves  II. Visual fields full to confrontation bilaterally. III, IV, VI: Pupils equally round and reactive to light, 3 to 2 mm bilaterally. EOMs: full, few beats of end gaze nystagmus when looking L or R.   V. Facial sensation intact to light touch bilaterally  VII: Facial movements symmetric and strong  VIII: Hearing intact to voice  IX,X: Palate elevates symmetrically.  No dysarthria  XI: Sternocleidomastoid and trapezius 5/5 bilaterally   XII: Tongue is midline    Motor     Right Left   Right Left   Deltoid 5 5  Hip Flexion 5 5   Biceps 5 5  Knee Extension 5 5   Triceps 5 5  Knee Flexion 5 5   Handgrip 5 5  Ankle Dorsiflexion 5 5       Ankle Plantarflexion 5 5 Tone: Normal in all four limbs    Bulk: Normal in all four limbs with no evidence of atrophy    Pronator drift: absent bilaterally    Sensation  Light Touch: Intact distally in all four limbs  Pinprick: decreased lower extremities R > L   Vibration: Mild to moderately reduced ankles b/l     Reflexes    No Harris's     No pathological reflexes     Coordination  Rapid alternating movements normal in bilateral upper extremities  Finger to nose testing normal bilaterally    Gait  Wide based, antalgic     Labs   No current labs or images for me to review    Imaging    MRI brain report reviewed- mild atrophy and microvascular ischemic changes.  No acute findings ( no images)     I have personally reviewed all pertinent neuro labs and images today     Electronically signed by HONG Springer on 12/14/2022 at 9:07 AM

## 2022-12-14 NOTE — TELEPHONE ENCOUNTER
Donepezil does not need prior auth.   Electronically signed by Camilla Limon MA on 12/14/2022 at 2:17 PM

## 2022-12-22 ENCOUNTER — TELEPHONE (OUTPATIENT)
Dept: NEUROLOGY | Age: 62
End: 2022-12-22

## 2022-12-22 RX ORDER — DONEPEZIL HYDROCHLORIDE 10 MG/1
5 TABLET, FILM COATED ORAL NIGHTLY
Qty: 30 TABLET | Refills: 0 | Status: SHIPPED | OUTPATIENT
Start: 2022-12-22

## 2022-12-22 NOTE — TELEPHONE ENCOUNTER
Patient called in, wants to know if we can switch her Aricept to the generic version because her insurance doesn't want to cover the name brand and it would be over $5000 if she got it out-of-pocket.

## 2023-01-03 ENCOUNTER — PATIENT MESSAGE (OUTPATIENT)
Dept: NEUROLOGY | Age: 63
End: 2023-01-03

## 2023-01-03 ENCOUNTER — TELEPHONE (OUTPATIENT)
Dept: NEUROLOGY | Age: 63
End: 2023-01-03

## 2023-01-03 DIAGNOSIS — R41.841 COGNITIVE COMMUNICATION DEFICIT: Primary | ICD-10-CM

## 2023-01-03 NOTE — TELEPHONE ENCOUNTER
Referral placed at last visit. MA called patient with phone number.   Electronically signed by Calista Mccabe MA on 1/3/2023 at 3:00 PM

## 2023-01-03 NOTE — TELEPHONE ENCOUNTER
Regarding: FW: Speech assessmebt    During her visit I did refer to speech therapy. Can you check on this please? Thank you.   ----- Message -----  From: [place], [place]  Sent: 1/3/2023  12:39 PM EST  To: HONG Posada  Subject: Speech assessmebt                                ----- Message from Blu [place], [place] sent at 1/3/2023 12:39 PM EST -----       ----- Message from [facility] to Leatha Millan, [address] sent at 1/3/2023  9:18 AM -----   When I was at your office, you had mentioned an assessment for a speech rehab. At the time I did not have my new insurance company card yet. So I would ask if you want to make the referral now, I have my new [employer] coverage. Member ID is  [id number]  My group # is  [id number]    Please let me know if I need to call to set up an appointment and where I would call please.     Thank you

## 2023-01-03 NOTE — TELEPHONE ENCOUNTER
From: Jovon Mendoza  To: Tigre Leigh  Sent: 1/3/2023 9:18 AM EST  Subject: Speech assessmebt    When I was at your office, you had mentioned an assessment for a speech rehab. At the time I did not have my new insurance company card yet. So I would ask if you want to make the referral now, I have my new [employer] coverage. Member ID is  [id number]  My group # is  [id number]    Please let me know if I need to call to set up an appointment and where I would call please.     Thank you

## 2023-01-18 ENCOUNTER — TELEPHONE (OUTPATIENT)
Dept: NEUROLOGY | Age: 63
End: 2023-01-18

## 2023-01-18 NOTE — TELEPHONE ENCOUNTER
Can you please check on the referral I placed for OT driving screen at [facility]? This was placed on 12/14/22. Patient states she called them and they said they never received referral.     Also, She is requesting formal cogntive evaluation be done at [facility] due to long wait with Adri.      Thank you

## 2023-01-18 NOTE — TELEPHONE ENCOUNTER
Regarding: Speech assessmebt  ----- Message from HONG Landry sent at 1/18/2023  8:13 AM EST -----       ----- Message from Elvia Bang to HONG Landry sent at 1/17/2023  3:51 PM -----   I also called [facility] today to see if possibly I had not heard from them for the driving evaluation was because of the change in my insurance to [employer]. They said they did not receive a referral or order from your office. She said to get the necessary papers sent to begin the process to be put on the schedule usually takes several weeks.  She said to fax orders to [facility] at  [phone].      ----- Message -----       From:Elvia Bang       Sent:1/17/2023  3:37 PM EST         To:Patient Medical Advice Request Message List    Subject:Speech assessmebt    [facility] and was told because they lost two therapists this past summer it is an unknown amount of time before an opening could become available.   The woman on the phone said there is another facility somewhere in [place].   After I hung up with them, I called my local hospital, [facility] therapy department, they said they could get me in for an assessment as early as next week depending on when the referral came through.  I did not know if this was an option, but the [facility] response sounded like a very extended wait.  Just in case you want [facility] information, I am sending an attachment.       ----- Message -----       From:Elvia Bang       Sent:1/3/2023  9:18 AM EST         To:Thu Conrad    Subject:Speech assessmebt    When I was at your office, you had mentioned an assessment for a speech rehab.  At the time I did not have my new insurance company card yet.  So I would ask if you want to make the referral now, I have my new [employer] coverage.  Member ID is  [id number]  My group # is  [id number]    Please let me know if I need to call to set up an  appointment and where I would call please.     Thank you

## 2023-01-18 NOTE — TELEPHONE ENCOUNTER
Faxed orders to THE [facility] for Speech therapy , krista young @ [phone] ,and Donzetta Krabbe of [address]

## 2023-01-25 ENCOUNTER — TELEPHONE (OUTPATIENT)
Dept: FAMILY MEDICINE CLINIC | Age: 63
End: 2023-01-25

## 2023-01-25 ENCOUNTER — PREP FOR PROCEDURE (OUTPATIENT)
Dept: ENT CLINIC | Age: 63
End: 2023-01-25

## 2023-01-25 NOTE — TELEPHONE ENCOUNTER
----- Message from Samira Lafleur LPN sent at 4/16/1097  1:37 PM EST -----  Subject: Appointment Request    Reason for Call: New Patient/New to Provider Appointment needed: Routine   Physical Exam    QUESTIONS    Reason for appointment request? No appointments available during search     Additional Information for Provider?  Patient was referred to dr. Ela Mckeon   by another patient she would like to establish with her please call to   schedule   ---------------------------------------------------------------------------  --------------  Danette Martino INFO  [phone]; OK to leave message on voicemail  ---------------------------------------------------------------------------  --------------  SCRIPT ANSWERS  COVID Screen: Bettie Nicole

## 2023-01-25 NOTE — TELEPHONE ENCOUNTER
Right now at this time, I cannot.   I may be more available in the Spring/Summer, but I'm too full right now

## 2023-01-26 NOTE — TELEPHONE ENCOUNTER
Spoke with Connor Rosado; let her know that you were not currently accepting patients. She states she will just keep calling back to see. I also let her know that you mentioned that it may be closer to Spring/Summer, but that it was not a guarantee.

## 2023-01-31 ENCOUNTER — ANESTHESIA EVENT (OUTPATIENT)
Dept: OPERATING ROOM | Age: 63
End: 2023-01-31
Payer: COMMERCIAL

## 2023-01-31 RX ORDER — CLOTRIMAZOLE 1 %
CREAM (GRAM) TOPICAL
COMMUNITY
Start: 2022-11-27

## 2023-01-31 RX ORDER — DEXTROMETHORPHAN HYDROBROMIDE, BUPROPION HYDROCHLORIDE 105; 45 MG/1; MG/1
TABLET, MULTILAYER, EXTENDED RELEASE ORAL DAILY
COMMUNITY
Start: 2022-12-08

## 2023-01-31 RX ORDER — DEXTROMETHORPHAN HYDROBROMIDE, BUPROPION HYDROCHLORIDE 105; 45 MG/1; MG/1
TABLET, MULTILAYER, EXTENDED RELEASE ORAL
COMMUNITY
End: 2023-01-31

## 2023-01-31 RX ORDER — MIRTAZAPINE 15 MG/1
TABLET, FILM COATED ORAL
COMMUNITY
Start: 2023-01-18

## 2023-01-31 NOTE — PROGRESS NOTES
[facility] PRE-ADMISSION TESTING INSTRUCTIONS    The Preadmission Testing patient is instructed accordingly using the following criteria (check applicable):    ARRIVAL INSTRUCTIONS:  [x] Parking the day of Surgery is located in the Main Entrance lot. Upon entering the door, make an immediate right to the surgery reception desk    [x] Bring photo ID and insurance card    [] Bring in a copy of Living will or Durable Power of  papers. [x] Please be sure to arrange for responsible adult to provide transportation to and from the hospital    [x] Please arrange for responsible adult to be with you for the 24 hour period post procedure due to having anesthesia    [x] If you awake am of surgery not feeling well or have temperature >100 please call [phone]    GENERAL INSTRUCTIONS:    [x] Nothing by mouth after midnight, including gum, candy, mints or water    [x] You may brush your teeth, but do not swallow any water    [x] Take medications as instructed with 1-2 oz of water    [x] Stop herbal supplements and vitamins 5 days prior to procedure    [x] Follow preop dosing of blood thinners per physician instructions    [] Take 1/2 dose of evening insulin, but no insulin after midnight    [] No oral diabetic medications after midnight    [] If diabetic and have low blood sugar or feel symptomatic, take 1-2oz apple juice only    [x] Bring inhalers day of surgery    [] Bring C-PAP/ Bi-Pap day of surgery    [] Bring urine specimen day of surgery    [x] Shower or bath with soap, lather and rinse well, AM of Surgery, no lotion, powders or creams to surgical site    [] Follow bowel prep as instructed per surgeon    [x] No tobacco products within 24 hours of surgery     [x] No alcohol or illegal drug use within 24 hours of surgery.     [x] Jewelry, body piercing's, eyeglasses, contact lenses and dentures are not permitted into surgery (bring cases)      [x] Please do not wear any nail polish, make up or hair products on the day of surgery    [x] You can expect a call the business day prior to procedure to notify you if your arrival time changes    [x] If you receive a survey after surgery we would greatly appreciate your comments    [] Parent/guardian of a minor must accompany their child and remain on the premises  the entire time they are under our care     [] Pediatric patients may bring favorite toy, blanket or comfort item with them    [] A caregiver or family member must remain with the patient during their stay if they are mentally handicapped, have dementia, disoriented or unable to use a call light or would be a safety concern if left unattended    [x] Please notify surgeon if you develop any illness between now and time of surgery (cold, cough, sore throat, fever, nausea, vomiting) or any signs of infections  including skin, wounds, and dental.    [x]  The Outpatient Pharmacy is available to fill your prescription here on your day of surgery, ask your preop nurse for details    [] Other instructions    EDUCATIONAL MATERIALS PROVIDED:    [] PAT Preoperative Education Packet/Booklet     [] Medication List    [] Transfusion bracelet applied with instructions    [] Shower with soap, lather and rinse well, and use CHG wipes provided the evening before surgery as instructed    [] Incentive spirometer with instructions

## 2023-02-01 ENCOUNTER — HOSPITAL ENCOUNTER (OUTPATIENT)
Age: 63
Setting detail: OUTPATIENT SURGERY
Discharge: HOME | End: 2023-02-01
Attending: OTOLARYNGOLOGY | Admitting: OTOLARYNGOLOGY
Payer: COMMERCIAL

## 2023-02-01 ENCOUNTER — ANESTHESIA (OUTPATIENT)
Dept: OPERATING ROOM | Age: 63
End: 2023-02-01
Payer: COMMERCIAL

## 2023-02-01 VITALS
HEIGHT: 66 IN | WEIGHT: 293 LBS | TEMPERATURE: 96.7 F | SYSTOLIC BLOOD PRESSURE: 163 MMHG | HEART RATE: 81 BPM | OXYGEN SATURATION: 96 % | RESPIRATION RATE: 17 BRPM | DIASTOLIC BLOOD PRESSURE: 74 MMHG | BODY MASS INDEX: 47.09 KG/M2

## 2023-02-01 LAB
HCT VFR BLD CALC: 42.6 % (ref 34–48)
HEMOGLOBIN: 13.6 G/DL (ref 11.5–15.5)
MCH RBC QN AUTO: 29.4 PG (ref 26–35)
MCHC RBC AUTO-ENTMCNC: 31.9 % (ref 32–34.5)
MCV RBC AUTO: 92.2 FL (ref 80–99.9)
PDW BLD-RTO: 13.4 FL (ref 11.5–15)
PLATELET # BLD: 283 E9/L (ref 130–450)
PMV BLD AUTO: 10.1 FL (ref 7–12)
RBC # BLD: 4.62 E12/L (ref 3.5–5.5)
WBC # BLD: 6.8 E9/L (ref 4.5–11.5)

## 2023-02-01 PROCEDURE — 85027 COMPLETE CBC AUTOMATED: CPT

## 2023-02-01 PROCEDURE — 7100000011 HC PHASE II RECOVERY - ADDTL 15 MIN: Performed by: OTOLARYNGOLOGY

## 2023-02-01 PROCEDURE — 7100000010 HC PHASE II RECOVERY - FIRST 15 MIN: Performed by: OTOLARYNGOLOGY

## 2023-02-01 PROCEDURE — 3600000002 HC SURGERY LEVEL 2 BASE: Performed by: OTOLARYNGOLOGY

## 2023-02-01 PROCEDURE — 7100000001 HC PACU RECOVERY - ADDTL 15 MIN: Performed by: OTOLARYNGOLOGY

## 2023-02-01 PROCEDURE — 6360000002 HC RX W HCPCS

## 2023-02-01 PROCEDURE — C1763 CONN TISS, NON-HUMAN: HCPCS | Performed by: OTOLARYNGOLOGY

## 2023-02-01 PROCEDURE — 2500000003 HC RX 250 WO HCPCS

## 2023-02-01 PROCEDURE — 2580000003 HC RX 258

## 2023-02-01 PROCEDURE — 3600000012 HC SURGERY LEVEL 2 ADDTL 15MIN: Performed by: OTOLARYNGOLOGY

## 2023-02-01 PROCEDURE — 3700000000 HC ANESTHESIA ATTENDED CARE: Performed by: OTOLARYNGOLOGY

## 2023-02-01 PROCEDURE — 69421 INCISION OF EARDRUM: CPT | Performed by: OTOLARYNGOLOGY

## 2023-02-01 PROCEDURE — 2709999900 HC NON-CHARGEABLE SUPPLY: Performed by: OTOLARYNGOLOGY

## 2023-02-01 PROCEDURE — 36415 COLL VENOUS BLD VENIPUNCTURE: CPT

## 2023-02-01 PROCEDURE — 7100000000 HC PACU RECOVERY - FIRST 15 MIN: Performed by: OTOLARYNGOLOGY

## 2023-02-01 PROCEDURE — 93005 ELECTROCARDIOGRAM TRACING: CPT

## 2023-02-01 PROCEDURE — 6370000000 HC RX 637 (ALT 250 FOR IP): Performed by: OTOLARYNGOLOGY

## 2023-02-01 PROCEDURE — 3700000001 HC ADD 15 MINUTES (ANESTHESIA): Performed by: OTOLARYNGOLOGY

## 2023-02-01 DEVICE — BIODESIGN OTOLOGIC REPAIR GRAFT
Type: IMPLANTABLE DEVICE | Site: EAR | Status: FUNCTIONAL
Brand: BIODESIGN

## 2023-02-01 RX ORDER — PROCHLORPERAZINE EDISYLATE 5 MG/ML
5 INJECTION INTRAMUSCULAR; INTRAVENOUS
Status: CANCELLED | OUTPATIENT
Start: 2023-02-01 | End: 2023-02-02

## 2023-02-01 RX ORDER — FENTANYL CITRATE 50 UG/ML
INJECTION, SOLUTION INTRAMUSCULAR; INTRAVENOUS PRN
Status: DISCONTINUED | OUTPATIENT
Start: 2023-02-01 | End: 2023-02-01 | Stop reason: SDUPTHER

## 2023-02-01 RX ORDER — GINSENG 100 MG
CAPSULE ORAL PRN
Status: DISCONTINUED | OUTPATIENT
Start: 2023-02-01 | End: 2023-02-01 | Stop reason: ALTCHOICE

## 2023-02-01 RX ORDER — SUCCINYLCHOLINE/SOD CL,ISO/PF 200MG/10ML
SYRINGE (ML) INTRAVENOUS PRN
Status: DISCONTINUED | OUTPATIENT
Start: 2023-02-01 | End: 2023-02-01 | Stop reason: SDUPTHER

## 2023-02-01 RX ORDER — LIDOCAINE HYDROCHLORIDE 20 MG/ML
INJECTION, SOLUTION EPIDURAL; INFILTRATION; INTRACAUDAL; PERINEURAL PRN
Status: DISCONTINUED | OUTPATIENT
Start: 2023-02-01 | End: 2023-02-01 | Stop reason: SDUPTHER

## 2023-02-01 RX ORDER — SODIUM CHLORIDE 0.9 % (FLUSH) 0.9 %
5-40 SYRINGE (ML) INJECTION PRN
Status: CANCELLED | OUTPATIENT
Start: 2023-02-01

## 2023-02-01 RX ORDER — SODIUM CHLORIDE 9 MG/ML
INJECTION, SOLUTION INTRAVENOUS PRN
Status: CANCELLED | OUTPATIENT
Start: 2023-02-01

## 2023-02-01 RX ORDER — SODIUM CHLORIDE 9 MG/ML
INJECTION, SOLUTION INTRAVENOUS PRN
Status: DISCONTINUED | OUTPATIENT
Start: 2023-02-01 | End: 2023-02-01 | Stop reason: HOSPADM

## 2023-02-01 RX ORDER — SODIUM CHLORIDE 0.9 % (FLUSH) 0.9 %
5-40 SYRINGE (ML) INJECTION PRN
Status: DISCONTINUED | OUTPATIENT
Start: 2023-02-01 | End: 2023-02-01 | Stop reason: HOSPADM

## 2023-02-01 RX ORDER — ONDANSETRON 2 MG/ML
INJECTION INTRAMUSCULAR; INTRAVENOUS PRN
Status: DISCONTINUED | OUTPATIENT
Start: 2023-02-01 | End: 2023-02-01 | Stop reason: SDUPTHER

## 2023-02-01 RX ORDER — MIDAZOLAM HYDROCHLORIDE 1 MG/ML
INJECTION INTRAMUSCULAR; INTRAVENOUS PRN
Status: DISCONTINUED | OUTPATIENT
Start: 2023-02-01 | End: 2023-02-01 | Stop reason: SDUPTHER

## 2023-02-01 RX ORDER — PROPOFOL 10 MG/ML
INJECTION, EMULSION INTRAVENOUS PRN
Status: DISCONTINUED | OUTPATIENT
Start: 2023-02-01 | End: 2023-02-01 | Stop reason: SDUPTHER

## 2023-02-01 RX ORDER — SODIUM CHLORIDE, SODIUM LACTATE, POTASSIUM CHLORIDE, CALCIUM CHLORIDE 600; 310; 30; 20 MG/100ML; MG/100ML; MG/100ML; MG/100ML
INJECTION, SOLUTION INTRAVENOUS CONTINUOUS
Status: DISCONTINUED | OUTPATIENT
Start: 2023-02-01 | End: 2023-02-01 | Stop reason: HOSPADM

## 2023-02-01 RX ORDER — SODIUM CHLORIDE 0.9 % (FLUSH) 0.9 %
5-40 SYRINGE (ML) INJECTION EVERY 12 HOURS SCHEDULED
Status: DISCONTINUED | OUTPATIENT
Start: 2023-02-01 | End: 2023-02-01 | Stop reason: HOSPADM

## 2023-02-01 RX ORDER — SODIUM CHLORIDE 0.9 % (FLUSH) 0.9 %
5-40 SYRINGE (ML) INJECTION EVERY 12 HOURS SCHEDULED
Status: CANCELLED | OUTPATIENT
Start: 2023-02-01

## 2023-02-01 RX ORDER — DEXAMETHASONE SODIUM PHOSPHATE 4 MG/ML
INJECTION, SOLUTION INTRA-ARTICULAR; INTRALESIONAL; INTRAMUSCULAR; INTRAVENOUS; SOFT TISSUE PRN
Status: DISCONTINUED | OUTPATIENT
Start: 2023-02-01 | End: 2023-02-01 | Stop reason: SDUPTHER

## 2023-02-01 RX ADMIN — LIDOCAINE HYDROCHLORIDE 100 MG: 20 INJECTION, SOLUTION EPIDURAL; INFILTRATION; INTRACAUDAL; PERINEURAL at 10:56

## 2023-02-01 RX ADMIN — PROPOFOL 50 MG: 10 INJECTION, EMULSION INTRAVENOUS at 11:13

## 2023-02-01 RX ADMIN — FENTANYL CITRATE 100 MCG: 50 INJECTION, SOLUTION INTRAMUSCULAR; INTRAVENOUS at 10:56

## 2023-02-01 RX ADMIN — DEXAMETHASONE SODIUM PHOSPHATE 10 MG: 4 INJECTION, SOLUTION INTRAMUSCULAR; INTRAVENOUS at 11:00

## 2023-02-01 RX ADMIN — Medication 160 MG: at 10:56

## 2023-02-01 RX ADMIN — ONDANSETRON 4 MG: 2 INJECTION INTRAMUSCULAR; INTRAVENOUS at 11:00

## 2023-02-01 RX ADMIN — MIDAZOLAM 1 MG: 1 INJECTION INTRAMUSCULAR; INTRAVENOUS at 10:45

## 2023-02-01 RX ADMIN — SODIUM CHLORIDE: 9 INJECTION, SOLUTION INTRAVENOUS at 10:10

## 2023-02-01 RX ADMIN — PROPOFOL 200 MG: 10 INJECTION, EMULSION INTRAVENOUS at 10:56

## 2023-02-01 ASSESSMENT — PAIN DESCRIPTION - FREQUENCY: FREQUENCY: CONTINUOUS

## 2023-02-01 ASSESSMENT — PAIN SCALES - GENERAL
PAINLEVEL_OUTOF10: 0
PAINLEVEL_OUTOF10: 2

## 2023-02-01 ASSESSMENT — PAIN DESCRIPTION - ORIENTATION
ORIENTATION: RIGHT
ORIENTATION: RIGHT

## 2023-02-01 ASSESSMENT — PAIN DESCRIPTION - PAIN TYPE
TYPE: SURGICAL PAIN
TYPE: SURGICAL PAIN

## 2023-02-01 ASSESSMENT — PAIN DESCRIPTION - LOCATION
LOCATION: EAR
LOCATION: EAR

## 2023-02-01 ASSESSMENT — PAIN DESCRIPTION - DESCRIPTORS
DESCRIPTORS: DISCOMFORT
DESCRIPTORS: DISCOMFORT

## 2023-02-01 ASSESSMENT — PAIN - FUNCTIONAL ASSESSMENT: PAIN_FUNCTIONAL_ASSESSMENT: 0-10

## 2023-02-01 ASSESSMENT — PAIN DESCRIPTION - DIRECTION: RADIATING_TOWARDS: RIGHT EAR

## 2023-02-01 NOTE — H&P
Subjective:      Patient ID:  Gucci Prabhakar is a 59 y.o. female. HPI:     Patient presents today with a severe problem of the right ear. It started 2 months ago. Patient states that nothing makes it better and  motion  makes it worse. Pain: yes               Side: right  Drainage:  no               Side: bilateral       Trauma history to the ear:                Side: bilateral               Desc:  Hyperbaric chamber caused bleeding and pain to the ear. Hearing Aids: no        History of surgery to the head/neck: no               Description: none  History of cerumen impaction: no  History of noise exposure: no               Type: none  Spinning: yes with position changes               Length of time: seconds  Hearing loss: yes                Fluctuating: no  Aural pressure: yes  Tinnitus: no  Otalgia: yes     In 2017 she was in a hyperbaric chamber and had severe pain and bleeding form the right ear. Presented to [facility] and saw an ENT. A few days later he placed in the right ear. Last summer she suspected the tube was out. 4 months ago she started having a dull ache of the right ear. No treatment given. 2 months ago she started having room spinning  vertigo. She presented to the ER at that time. They Did a CT which was negative. PCP referred  her to Dr. FLANNERY.   Dr. FLANNERY referred her to us for a retrained PE tube behind the right TM. Past Medical History        Past Medical History:   Diagnosis Date    Arthritis      Asthma      Chronic fatigue      Class 3 severe obesity due to excess calories with serious comorbidity and body mass index (BMI) greater than or equal to 70 in adult [facility])      RENETTA (generalized anxiety disorder)      Meningitis due to viruses      Pancreatitis      Sleep apnea           Past Surgical History         Past Surgical History:   Procedure Laterality Date    APPENDECTOMY        CHOLECYSTECTOMY        [facility]    [facility] AND [facility]URETTAGE [facility] UTERUS         [facility]    ENDOSCOPY, COLON, DIAGNOSTIC        HYSTEROSCOPY   01/12/2017     [facility]    PILONIDAL CYST EXCISION N/A      SKIN BIOPSY         breast         Family History         Family History   Problem Relation Age of Onset    Hypertension Mother      Stroke Mother      Cancer Father           throat, colon, stomach    Heart Disease Father      Atrial Fibrillation Father      Hypertension Sister      Prostate Cancer Brother           Social History   Social History            Socioeconomic History    Marital status: Single       Spouse name: None    Number of children: None    Years of education: None    Highest education level: None   Occupational History    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 HENT:  Positive for congestion, ear pain and postnasal drip. Negative for ear discharge, rhinorrhea, sinus pressure, sneezing, sore throat and tinnitus.     Eyes:  Negative for pain and discharge. Respiratory:  Negative for cough and shortness of breath. Cardiovascular:  Negative for chest pain. Gastrointestinal:  Negative for diarrhea and vomiting. Genitourinary:  Negative for flank pain. Musculoskeletal:  Negative for back pain and neck pain. Skin:  Negative for rash. Allergic/Immunologic: Negative. Neurological:  Positive for dizziness. Negative for syncope and headaches. All other systems reviewed and are negative. Objective:   Physical Exam  Vitals reviewed. Constitutional:       Appearance: Normal appearance. HENT:      Head: Normocephalic and atraumatic. Jaw: There is normal jaw occlusion. No tenderness. Right Ear: External ear normal.      Left Ear: Tympanic membrane, ear canal and external ear normal.      Ears:      Nose: Congestion present. Right Turbinates: Swollen. Not pale. Left Turbinates: Swollen. Not pale. Mouth/Throat:      Lips: Pink. Mouth: Mucous membranes are moist.      Pharynx: Oropharynx is clear. Eyes:      General: Lids are normal.      Conjunctiva/sclera: Conjunctivae normal.      Pupils: Pupils are equal, round, and reactive to light. Cardiovascular:      Rate and Rhythm: Normal rate and regular rhythm. Pulses: Normal pulses. Pulmonary:      Effort: Pulmonary effort is normal. No respiratory distress. Breath sounds: No stridor. Abdominal:      General: Abdomen is flat. Palpations: Abdomen is soft. Musculoskeletal:         General: Normal range of motion. Cervical back: Normal range of motion. No rigidity. Skin:     General: Skin is warm and dry. Neurological:      General: No focal deficit present. Mental Status: She is alert and oriented to person, place, and time. Psychiatric:         Attention and Perception: Attention normal.         Mood and Affect: Affect normal.         Behavior: Behavior normal. Behavior is cooperative.          Thought Content: Thought content normal.         Judgment: Judgment normal.      Right TM: retained tube behind TM        AUDIO/TYMP:        Audiogram and tympanogram reviewed with patient. Audiogram reveals 25 dB hearing loss in the right ear with 100% discrimination at 55 dB, 20 dB of hearing loss in the left ear with 100% discrimination at 55 dB. Audiogram is asymmetrical on right. Tympanogram reveals type A curve in the right ear, with type A curve in the left ear. Marcial hallpike:  LEFT: (positive)   RIGHT: (negative)     Epley was performed on the left x 2      Recheck was negative after 2 manuvers, however, the patient did still exhibit mild disequilibrium. Assessment:        Diagnosis Orders   1. Retained myringotomy tube          2. Vertigo                                 Plan:      Was seen and examined today for complaints of vertigo and possible retained tympanostomy tube. Upon examination the patient was noted to have a retained foreign body behind the right TM. The images above was reviewed with Dr. Avis Hopkins who confirmed that there is a retained tympanostomy tube behind the right TM. Discussed with patient that without considerable issues caused by the retained tube no further intervention would be needed due to the possible risks versus reward of the procedure. As noted above a Marcial-Hallpike procedure was performed and was positive to the left side. Epley maneuver was performed x2 to the left side with resolution of nausea and room spinning. The patient did state that she continued to have  mild disequilibrium following the manuevers. As noted above the Marcial-Hallpike procedure was negative to the right side. The patient will be seen in 1 week for reevaluation of her BPPV symptoms. At that time we will evaluate necessity of previously scheduled VNG. Jamie Castro agrees to this plan will follow up in 1 week. She was instructed to call for any new or worsening symptoms prior to her next appointment. Mile Ayala CNP am scribing for and in the presence of Charlette Mohs CNP 4:04 PM 08/03/22        Attestation:     Onesimo Hammans CNP, personally performed the services described in this documentation as scribed by Afsaneh Walters CNP in my presence, and it is both accurate and complete.

## 2023-02-01 NOTE — ANESTHESIA PRE PROCEDURE
Department of Anesthesiology  Preprocedure Note       Name:  Maxi Walters   Age:  57 y.o.  :  1960                                          MRN:  [id number]         Date:  2023      Surgeon: Fadia Christiansen):  Adry Schmidt DO    Procedure: Procedure(s):  RIGHT EAR TUBE REMOVAL AND PATCH   +++LATEX ALLERGY+++    Medications prior to admission:   Prior to Admission medications    Medication Sig Start Date End Date Taking? Authorizing Provider   Dextromethorphan-buPROPion ER (AUVELITY)  MG TBCR daily 22  Yes Historical Provider, MD   mirtazapine (REMERON) 15 MG tablet TAKE 1/2 OR 1 TABLET BY MOUTH AT BEDTIME AS NEEDED 23   Historical Provider, MD   clotrimazole (LOTRIMIN) 1 % cream apply to affected area twice a day if needed 22   Historical Provider, MD   donepezil (ARICEPT) 10 MG tablet Take 0.5 tablets by mouth nightly  Patient taking differently: Take 10 mg by mouth daily Takes at lunchtime 22   HONG Springer   OXYGEN Inhale into the lungs    Historical Provider, MD   omeprazole (PRILOSEC) 40 MG delayed release capsule Take 40 mg by mouth as needed    Historical Provider, MD   clonazePAM (KLONOPIN) 2 MG tablet Take 1 mg by mouth at bedtime.     Historical Provider, MD   albuterol (PROVENTIL) (2.5 MG/3ML) 0.083% nebulizer solution Take 2.5 mg by nebulization daily 22   Historical Provider, MD   albuterol sulfate  (90 Base) MCG/ACT inhaler Inhale 2 puffs into the lungs 4 times daily as needed for Wheezing 19   Ta Orellana MD   Ascorbic Acid (VITAMIN C) 500 MG tablet Take 1,000 mg by mouth daily    Historical Provider, MD   Multiple Vitamins-Minerals (THERAPEUTIC MULTIVITAMIN-MINERALS) tablet Take 1 tablet by mouth daily    Historical Provider, MD       Current medications:    Current Facility-Administered Medications   Medication Dose Route Frequency Provider Last Rate Last Admin    lactated ringers IV soln infusion   IntraVENous Continuous Robert Kianna Carver, DO        sodium chloride flush 0.9 % injection 5-40 mL  5-40 mL IntraVENous 2 times per day Randol Edmond, DO        sodium chloride flush 0.9 % injection 5-40 mL  5-40 mL IntraVENous PRN Kari Pruitt, DO        0.9 % sodium chloride infusion   IntraVENous PRN Еленаol Edmond, DO           Allergies:     Allergies   Allergen Reactions    Latex Anaphylaxis    Adhesive Tape      Tears skin off    Gabapentin Diarrhea     constant diarrhea    Pcn [Penicillins] Other (See Comments)     As child    Sulfa Antibiotics Diarrhea       Problem List:    Patient Active Problem List   Diagnosis Code    Acute sinusitis J01.90    Bronchitis J40    Chronic fatigue R53.82    Class 3 severe obesity due to excess calories with serious comorbidity and body mass index (BMI) greater than or equal to 70 in adult ([facility] [address] E66.01, Z68.45    Retained myringotomy tube Z96.22       Past Medical History:        Diagnosis Date    Arthritis     Asthma     Chronic fatigue     Class 3 severe obesity due to excess calories with serious comorbidity and body mass index (BMI) greater than or equal to 70 in adult [facility])     COVID-19 05/2022    RENETTA (generalized anxiety disorder)     GERD (gastroesophageal reflux disease)     O2 dependent     2 liters nc continuous    CALOS on CPAP     Retained myringotomy tube in right ear     For OR 2/1/23       Past Surgical History:        Procedure Laterality Date    APPENDECTOMY      CHOLECYSTECTOMY      [facility]    [facility]TERUS      [facility]    ENDOSCOPY, COLON, DIAGNOSTIC      [facility]  [facility]2017    [facility]    MYRINGOTOMY AND TYMPANOSTOMY TUBE PLACEMENT      PILONIDAL CYST EXCISION N/A     SKIN BIOPSY      breast       Social History:    Social History     Tobacco Use    Smoking status: Never    Smokeless tobacco: Never   Substance Use Topics    Alcohol use: Yes     Comment: rare                                Counseling given: Not Answered      Vital Signs (Current):   Vitals:    01/31/23 1249 02/01/23 0838   BP:  (!) 142/81   Pulse:  85   Resp:  20   Temp:  97.6 °F (36.4 °C)   TempSrc:  Temporal   SpO2:  96%   Weight: (!) 430 lb (195 kg) (!) 430 lb (195 kg)   Height: 5' 6\" (1.676 m) 5' 6\" (1.676 m)                                              BP Readings from Last 3 Encounters:   02/01/23 (!) 142/81   12/14/22 (!) 155/98   10/27/22 (!) 143/85       NPO Status: Time of last liquid consumption: 2300                        Time of last solid consumption: 1800                        Date of last liquid consumption: 01/31/23                        Date of last solid food consumption: 01/31/23    BMI:   Wt Readings from Last 3 Encounters:   02/01/23 (!) 430 lb (195 kg)   12/14/22 (!) 434 lb (196.9 kg)   10/27/22 (!) 400 lb (181.4 kg)     Body mass index is 69.4 kg/m². CBC:   Lab Results   Component Value Date/Time    WBC 6.8 02/01/2023 08:37 AM    RBC 4.62 02/01/2023 08:37 AM    HGB 13.6 02/01/2023 08:37 AM    HCT 42.6 02/01/2023 08:37 AM    MCV 92.2 02/01/2023 08:37 AM    RDW 13.4 02/01/2023 08:37 AM     02/01/2023 08:37 AM       CMP:   Lab Results   Component Value Date/Time     04/04/2017 02:00 PM    K 5.1 04/04/2017 02:00 PM     04/04/2017 02:00 PM    CO2 26 04/04/2017 02:00 PM    BUN 11 04/04/2017 02:00 PM    CREATININE 0.8 04/04/2017 02:00 PM    GFRAA >60 04/04/2017 02:00 PM    LABGLOM >60 04/04/2017 02:00 PM    GLUCOSE 91 04/04/2017 02:00 PM    PROT 7.8 04/04/2017 02:00 PM    CALCIUM 9.6 04/04/2017 02:00 PM    BILITOT 0.5 04/04/2017 02:00 PM    ALKPHOS 117 04/04/2017 02:00 PM    AST 26 04/04/2017 02:00 PM    ALT 12 04/04/2017 02:00 PM       POC Tests: No results for input(s): POCGLU, POCNA, POCK, POCCL, POCBUN, POCHEMO, POCHCT in the last 72 hours.     Coags: No results found for: PROTIME, INR, APTT    HCG (If Applicable): No results found for: PREGTESTUR, PREGSERUM, HCG, HCGQUANT     ABGs: No results found for: PHART, PO2ART, KLJ9RSL, BTV5SQM, BEART, M9PXIZCA     Type & Screen (If Applicable):  No results found for: LABABO, LABRH    Drug/Infectious Status (If Applicable):  No results found for: HIV, HEPCAB    COVID-19 Screening (If Applicable):   Lab Results   Component Value Date/Time    COVID19 0.06 07/18/2022 12:02 PM           Anesthesia Evaluation  Patient summary reviewed and Nursing notes reviewed no history of anesthetic complications:   Airway: Mallampati: III  TM distance: >3 FB   Neck ROM: full  Mouth opening: > = 3 FB   Dental:    (+) lower dentures      Pulmonary:normal exam    (+) sleep apnea (O2): on CPAP,                            ROS comment: On 2L O2 NC continuously       Cardiovascular:  Exercise tolerance: good (>4 METS),                     Neuro/Psych:               GI/Hepatic/Renal:   (+) GERD: well controlled, morbid obesity          Endo/Other:                     Abdominal:             Vascular: Other Findings:           Anesthesia Plan      general     ASA 3       Induction: intravenous. MIPS: Postoperative opioids intended and Prophylactic antiemetics administered. Anesthetic plan and risks discussed with patient and sibling. Plan discussed with CRNA.                     Fernanda Rey MD   2/1/2023

## 2023-02-01 NOTE — OP NOTE
Operative Note      Patient: Gaby Thomas  YOB: 1960  MRN: [id number]    Date of Procedure: 2/1/2023    Pre-Op Diagnosis: Retained myringotomy tube [Z96.22]    Post-Op Diagnosis: Same       Procedure(s):  RIGHT EAR TUBE REMOVAL AND PATCH    Surgeon(s):  Shashi Hillman DO    Assistant:   Resident: Bernadette Khan DO    Anesthesia: General    Estimated Blood Loss (mL): Minimal    Complications: None    Specimens:   * No specimens in log *    Implants:  * No implants in log *      Drains: * No LDAs found *    Findings: Left tympanic membrane healthy appearing, Right tympanic membrane with dark hue posterior to drum    Detailed Description of Procedure:       Procedure:  Pt was consented preoperatively, taken to the OR and identified appropriately. Pt was placed in the supine position and given to anesthesia for induction via face mask. Right  A microscope was brought in and a speculum was placed in the right ear and the external auditory canal was cleared of cerumen with a curette. With the tympanic membrane visualized, there was not infection and was not effusion present. At the anterior aspect of the drum a dark hue was seen posterior to the drum. A myringotomy was made for better visualization. The middle ear was explored without abnormal findings. A biodesign patch covered with bacitracin was placed over the myringotomy. Left  A microscope was brought in and a speculum was placed in the left ear and the external auditory canal was cleared of cerumen with a curette. With the tympanic membrane visualized, there was not infection/ was not effusion present. The pt was then given back to anesthesia for proper awakening. Pt tolerated the procedure with no complications.           Electronically signed by Bernadette Khan DO on 2/1/2023 at 11:11 AM

## 2023-02-01 NOTE — ANESTHESIA POSTPROCEDURE EVALUATION
Department of Anesthesiology  Postprocedure Note    Patient: Jennifer Rhodes  MRN: [id number]  YOB: 1960  Date of evaluation: 2/1/2023      Procedure Summary     Date: 02/01/23 Room / Location: [facility] OR 07 / [facility]    Anesthesia Start: 3376 Anesthesia Stop: 1132    Procedure: RIGHT MIDDLE EAR EXPLORATION WITH PATCH (Right: Ear) Diagnosis:       Retained myringotomy tube      (Retained myringotomy tube [Z96.22])    Surgeons: Morris Matute DO Responsible Provider: Sheryl Bob MD    Anesthesia Type: General ASA Status: 3          Anesthesia Type: General    Leandro Phase I: Leandro Score: 9    Leandro Phase II: Leandro Score: 10      Anesthesia Post Evaluation    Patient location during evaluation: PACU  Patient participation: complete - patient participated  Level of consciousness: awake and alert  Pain score: 0  Airway patency: patent  Nausea & Vomiting: no vomiting and no nausea  Complications: no  Cardiovascular status: hemodynamically stable  Respiratory status: spontaneous ventilation  Hydration status: stable

## 2023-02-01 NOTE — H&P
Christian León was seen and re-examined preoperatively today, February 1, 2023. There was no substantial change in her physical and medical status. Patient is fit for the proposed surgical procedure. All questions were appropriately addressed and had no further questions regarding the risks, benefits, and alternatives of the procedure. Christian León and family wished to proceed.     Bria Benitez DO  Resident Physician  [facility])  Otolaryngology Residency  2/1/2023  10:05 AM

## 2023-02-02 LAB
EKG ATRIAL RATE: 81 BPM
EKG P AXIS: 49 DEGREES
EKG P-R INTERVAL: 178 MS
EKG Q-T INTERVAL: 382 MS
EKG QRS DURATION: 86 MS
EKG QTC CALCULATION (BAZETT): 443 MS
EKG R AXIS: 3 DEGREES
EKG T AXIS: 10 DEGREES
EKG VENTRICULAR RATE: 81 BPM

## 2023-02-20 ENCOUNTER — OFFICE VISIT (OUTPATIENT)
Dept: NEUROLOGY | Age: 63
End: 2023-02-20
Payer: COMMERCIAL

## 2023-02-20 VITALS
SYSTOLIC BLOOD PRESSURE: 122 MMHG | DIASTOLIC BLOOD PRESSURE: 80 MMHG | WEIGHT: 293 LBS | HEIGHT: 66 IN | BODY MASS INDEX: 47.09 KG/M2

## 2023-02-20 DIAGNOSIS — G31.84 MCI (MILD COGNITIVE IMPAIRMENT): Primary | ICD-10-CM

## 2023-02-20 DIAGNOSIS — U09.9 COVID-19 LONG HAULER: ICD-10-CM

## 2023-02-20 PROCEDURE — 99214 OFFICE O/P EST MOD 30 MIN: CPT | Performed by: PHYSICIAN ASSISTANT

## 2023-02-20 PROCEDURE — G8484 FLU IMMUNIZE NO ADMIN: HCPCS | Performed by: PHYSICIAN ASSISTANT

## 2023-02-20 PROCEDURE — G8427 DOCREV CUR MEDS BY ELIG CLIN: HCPCS | Performed by: PHYSICIAN ASSISTANT

## 2023-02-20 PROCEDURE — 1036F TOBACCO NON-USER: CPT | Performed by: PHYSICIAN ASSISTANT

## 2023-02-20 PROCEDURE — 3017F COLORECTAL CA SCREEN DOC REV: CPT | Performed by: PHYSICIAN ASSISTANT

## 2023-02-20 PROCEDURE — G8417 CALC BMI ABV UP PARAM F/U: HCPCS | Performed by: PHYSICIAN ASSISTANT

## 2023-02-20 RX ORDER — DONEPEZIL HYDROCHLORIDE 10 MG/1
10 TABLET, FILM COATED ORAL DAILY
Qty: 30 TABLET | Refills: 3 | Status: SHIPPED | OUTPATIENT
Start: 2023-02-20

## 2023-02-20 RX ORDER — MEMANTINE HYDROCHLORIDE 5 MG/1
TABLET ORAL
Qty: 60 TABLET | Refills: 2 | Status: SHIPPED | OUTPATIENT
Start: 2023-02-20 | End: 2023-03-20

## 2023-02-20 NOTE — PROGRESS NOTES
[facility] Neurology   Office visit- new patient     Date:  2/20/2023  Patient Name:  Gucci Prabhakar  YOB: 1960  MRN: [id number]     PCP:  Nikky Anna DO   Referring:  No ref. provider found      Chief Complaint: memory loss, dizziness     History obtained from: patient, chart     Assessment    Mild cognitive impairment Multifactorial. Symptoms started after COVID-19 infection- post covid brain fog is a recognized sequelae and she is likely experiencing difficulties related to this. Underlying depression and anxiety related to medical conditions also a contributing factor. Dizziness Again, multifactorial. Hx of chronic ear issue with tube placement in the R ear in 2018 that has since broke and needs to be removed (following with ENT), post-covid syndrome, Hx of suspected lumbar radiculopathy and evidence of decreased sensation in the BLE R > L. Plan  Aricept 10 mg nightly   Namenda titration to 10 mg BID   Continue cognitive therapy   RTO 3 months   Call with questions or concerns       History of Present Illness:  Gucci Prabhakar is a 57 y.o. right handed female presenting for evaluation of memory loss and dizziness. Symptoms course   Her problems began in June 2022 following her COVID infection from May. She began having increasing difficulties with her balance and reported not being able to stand feeling like she was being pushed forward or pulled to the side. She did go to the emergency room and was diagnosed with vertigo. Since that time the severity of that has decreased but she has noticed other symptoms including feeling like her eyes cannot focus, seeing halos around lights and trouble reaching for objects due to perception being off. She has had extensive eye testing and was told that it was not related to her eyes and that it was related to a communication block from her brain to her eyes.   She also reports memory difficulties described as knowing the word she wants to say, being able to see the word or write the word but not able to say the word. There is also a delay in her processing. She denies difficulty with ADLs. She denies personality changes. Does admit to being depressed related to all of her symptoms she has been experiencing over the last 6 months. Does not follow with anyone for this. She does admit to spending more money in recent months because she is \"not able to do anything else\". She does have a history of sleep apnea and does use her CPAP faithfully, however does report that it needs titrated. She has a history of a ruptured eardrum and a tube placed back in 2018. This has migrated behind the right eardrum and needs to have it removed. Due to her oxygen levels still being low from her COVID infection this has not been done yet. she is currently working with physical therapy for balance. She was previously recommended speech therapy as well for her cognitive deficits but her insurance would not approve both physical and speech therapy. She will be having new insurance after the first of the year. Did have MRI brain completed [facility], no acute findings ( no images to review today)     Interval history   Since last visit neuropsychological evaluation was completed which revealed mild cognitive impairment multifactorial related to post-COVID hypoxia, potentially undertreated CALOS, vascular changes in the brain, anxiety depression. She has had 3 sessions of cognitive therapy. She is planned for total of 20 sessions. So far has not noticed any significant changes with this and or starting Aricept. Continues to be increasingly frustrated and anxious and having trouble completing things at work. She writes up contracts for pools. She continues to report blurred vision and has an appointment with the eye doctor on March 6. Review of Systems:  + vision disturbance  + memory impairment  + dizziness     Allergies:       Allergies   Allergen Reactions Latex Anaphylaxis    Adhesive Tape      Tears skin off    Gabapentin Diarrhea     constant diarrhea    Pcn [Penicillins] Other (See Comments)     As child    Sulfa Antibiotics Diarrhea        Physical Examination  Vitals   Vitals:    02/20/23 1323   BP: 122/80   Site: Left Lower Arm   Position: Sitting   Cuff Size: Medium Adult   Weight: (!) 430 lb (195 kg)   Height: 5' 6\" (1.676 m)        General: Patient appears in no acute distress. Obese   HEENT: Normocephalic, atraumatic  Chest: effort normal on nasal O2   Extremities/Peripheral vascular: Peripheral edema/swelling noted. No cold limbs noted. Neurologic Examination    Mental Statusg. Alert, and oriented to person, place and time. Thought content appropriate. Cranial Nerves  II-XII grossly intact     Motor    BOUCHER well against gravity     Coordination  No resting tremors observed     Gait  Wide based, antalgic     Labs   No current labs or images for me to review    Imaging    MRI brain reviewed- mild atrophy and microvascular ischemic changes.  No acute findings     I have personally reviewed all pertinent neuro labs and images today     Electronically signed by HONG Coats on 2/20/2023 at 1:36 PM

## 2023-03-01 ENCOUNTER — OFFICE VISIT (OUTPATIENT)
Dept: ENT CLINIC | Age: 63
End: 2023-03-01
Payer: COMMERCIAL

## 2023-03-01 VITALS
SYSTOLIC BLOOD PRESSURE: 154 MMHG | HEIGHT: 66 IN | TEMPERATURE: 97.2 F | DIASTOLIC BLOOD PRESSURE: 93 MMHG | BODY MASS INDEX: 47.09 KG/M2 | HEART RATE: 92 BPM | WEIGHT: 293 LBS | OXYGEN SATURATION: 96 %

## 2023-03-01 DIAGNOSIS — Z96.22 RETAINED MYRINGOTOMY TUBE: Primary | ICD-10-CM

## 2023-03-01 DIAGNOSIS — J34.89 NASAL SEPTAL PERFORATION: ICD-10-CM

## 2023-03-01 PROCEDURE — 3017F COLORECTAL CA SCREEN DOC REV: CPT | Performed by: OTOLARYNGOLOGY

## 2023-03-01 PROCEDURE — 99212 OFFICE O/P EST SF 10 MIN: CPT | Performed by: OTOLARYNGOLOGY

## 2023-03-01 PROCEDURE — G8417 CALC BMI ABV UP PARAM F/U: HCPCS | Performed by: OTOLARYNGOLOGY

## 2023-03-01 PROCEDURE — G8484 FLU IMMUNIZE NO ADMIN: HCPCS | Performed by: OTOLARYNGOLOGY

## 2023-03-01 PROCEDURE — G8427 DOCREV CUR MEDS BY ELIG CLIN: HCPCS | Performed by: OTOLARYNGOLOGY

## 2023-03-01 PROCEDURE — 1036F TOBACCO NON-USER: CPT | Performed by: OTOLARYNGOLOGY

## 2023-03-01 ASSESSMENT — ENCOUNTER SYMPTOMS
SHORTNESS OF BREATH: 0
RESPIRATORY NEGATIVE: 1
APNEA: 0
EYE DISCHARGE: 0
GASTROINTESTINAL NEGATIVE: 1
CHEST TIGHTNESS: 0
VOMITING: 0
DIARRHEA: 0
EYE PAIN: 0
COLOR CHANGE: 0
ABDOMINAL PAIN: 0
EYES NEGATIVE: 1

## 2023-03-01 NOTE — PROGRESS NOTES
Subjective:      Patient ID:  Rosa Aaron is a 57 y.o. female. HPI:  here for post op tube removal  surgery 1 month ago. There are not changes since last visit. Pt has been doing ok.      Past Medical History:   Diagnosis Date    Arthritis     Asthma     Chronic fatigue     Class 3 severe obesity due to excess calories with serious comorbidity and body mass index (BMI) greater than or equal to 70 in adult [facility])     COVID-19 05/2022    RENETTA (generalized anxiety disorder)     GERD (gastroesophageal reflux disease)     O2 dependent     2 liters nc continuous    CALOS on CPAP     Retained myringotomy tube in right ear     For OR 2/1/23     Past Surgical History:   Procedure Laterality Date    APPENDECTOMY      CHOLECYSTECTOMY      COLONOSCOPY N/A [facility]13/[facility]    [facility]IL[facility] AND CURETTAGE OF UTERUS      [facility]    ENDOSCOPY, COLON, DIAGNOSTIC      HYSTEROSCOPY  01/12/2017    [facility]    INNER EAR SURGERY Right 2/1/2023    RIGHT MIDDLE EAR EXPLORATION WITH PATCH performed by Amy Willingham DO at 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 Negative for appetite change. HENT:  Negative for congestion and ear discharge. Eyes: Negative. Negative for pain, discharge and visual disturbance. Respiratory: Negative. Negative for apnea, chest tightness and shortness of breath. Cardiovascular: Negative. Negative for chest pain, palpitations and leg swelling. Gastrointestinal: Negative. Negative for abdominal pain, diarrhea and vomiting. Endocrine: Negative for cold intolerance, heat intolerance and polydipsia. Genitourinary: Negative. Negative for dysuria, flank pain and hematuria. Musculoskeletal: Negative. Negative for arthralgias, gait problem and neck pain. Skin: Negative. Negative for color change, pallor and rash. Allergic/Immunologic: Negative for environmental allergies, food allergies and immunocompromised state. Neurological:  Positive for dizziness. Negative for numbness and headaches. Hematological:  Negative for adenopathy. Psychiatric/Behavioral: Negative. Negative for behavioral problems and hallucinations. All other systems reviewed and are negative. Objective:   Physical Exam  Vitals and nursing note reviewed. Constitutional:       Appearance: She is well-developed. HENT:      Head: Normocephalic and atraumatic. Right Ear: Hearing, ear canal and external ear normal. Tympanic membrane is perforated. Left Ear: Hearing, tympanic membrane, ear canal and external ear normal.      Ears:      Comments: Patch is in the canal TM blocked. Once removed there is a small perforation where the exploration occurred but is much smaller than surgery. Nose: Nose normal.      Mouth/Throat:      Pharynx: Uvula midline. Eyes:      Conjunctiva/sclera: Conjunctivae normal.      Pupils: Pupils are equal, round, and reactive to light.   Cardiovascular:      Rate and Rhythm: Normal rate and regular rhythm.      Heart sounds: Normal heart sounds.   Pulmonary:      Effort: Pulmonary effort is normal.      Breath sounds: Normal breath sounds.   Abdominal:      General: Bowel sounds are normal.      Palpations: Abdomen is soft.   Musculoskeletal:      Cervical back: Normal range of motion and neck supple.   Skin:     General: Skin is warm and dry.   Neurological:      Mental Status: She is alert and oriented to person, place, and time.           Assessment:       Diagnosis Orders   1. Retained myringotomy tube        2. Nasal septal perforation                   Plan:      I will recheck the ear in 6 months to make sure the perforation is healing.   Follow up in 6 month(s)

## 2023-04-13 PROBLEM — M17.0 BILATERAL PRIMARY OSTEOARTHRITIS OF KNEE: Status: ACTIVE | Noted: 2017-10-06

## 2023-04-28 RX ORDER — MEMANTINE HYDROCHLORIDE 5 MG/1
10 TABLET ORAL 2 TIMES DAILY
Qty: 360 TABLET | Refills: 1 | Status: SHIPPED | OUTPATIENT
Start: 2023-04-28 | End: 2023-10-25

## 2023-08-03 ENCOUNTER — OFFICE VISIT (OUTPATIENT)
Dept: BARIATRICS/WEIGHT MGMT | Age: 63
End: 2023-08-03
Payer: COMMERCIAL

## 2023-08-03 VITALS
BODY MASS INDEX: 51.91 KG/M2 | WEIGHT: 293 LBS | HEIGHT: 63 IN | TEMPERATURE: 96.8 F | SYSTOLIC BLOOD PRESSURE: 128 MMHG | DIASTOLIC BLOOD PRESSURE: 79 MMHG | HEART RATE: 93 BPM

## 2023-08-03 DIAGNOSIS — Z99.89 OSA ON CPAP: ICD-10-CM

## 2023-08-03 DIAGNOSIS — E66.01 CLASS 3 SEVERE OBESITY DUE TO EXCESS CALORIES WITH SERIOUS COMORBIDITY AND BODY MASS INDEX (BMI) GREATER THAN OR EQUAL TO 70 IN ADULT (HCC): ICD-10-CM

## 2023-08-03 DIAGNOSIS — R53.82 CHRONIC FATIGUE: Primary | ICD-10-CM

## 2023-08-03 DIAGNOSIS — G47.33 OSA ON CPAP: ICD-10-CM

## 2023-08-03 PROCEDURE — 99202 OFFICE O/P NEW SF 15 MIN: CPT | Performed by: INTERNAL MEDICINE

## 2023-08-03 RX ORDER — SEMAGLUTIDE 0.25 MG/.5ML
0.25 INJECTION, SOLUTION SUBCUTANEOUS
Qty: 2 ML | Refills: 0 | Status: SHIPPED | OUTPATIENT
Start: 2023-08-03

## 2023-08-03 RX ORDER — SEMAGLUTIDE 0.5 MG/.5ML
0.5 INJECTION, SOLUTION SUBCUTANEOUS
Qty: 2 ML | Refills: 0 | Status: SHIPPED | OUTPATIENT
Start: 2023-08-03

## 2023-08-03 NOTE — PATIENT INSTRUCTIONS
Breakfast -     one high protein shake                            + 20 grams of fiber. Do this by either eating 12 tablespoons of the original, plain Fiber One cereal every day or 4 tablespoons of wheat dextrin powder (Benefiber or a generic brand) every day. Work up to this amount slowly by starting with only one-eighth to one-fourth of the target amount and then adding another one-eighth to one-fourth every one or two weeks until reaching the target. Also, fiber gummies containing inulin (such as Nature Made) are also an option. These fiber supplements are for the health of the colon. Their purpose is not to prevent or treat constipation. Lunch -           one high protein shake     Dinner -          one frozen meal       Shake options (<200 diya, >25 grams/protein) :  Nectar, Pure Protein, Premier, Fairlife, Boost Max, [employer] Max, BeneProtein and [employer] (which is lactose-free) are milk-based options; Nectar, Premier Protein Clear, IsoPure Protein Drink, and Protein 2 O are water-based options; (Premier Protein Clear, the water-based option, comes in a 20 oz bottle with 20 grams of protein and 90 calories. So you have to drink three each day which increases the cost.)  (Disclaimer: Dietary supplements rarely have their listed ingredients and the amount of each verified by a third party other. Sometimes they give verification for their claims to be GMO and gluten free and to be organic. However, even such verifications as these may still be untrustworthy.)     If you experience constipation, consider adding 1/2 teaspoon apple cider vinegar to each shake a few minutes before drinking it.     Frozen meal options (<350 diya):  Weight Watchers Smart Ones, [employer] Cuisine, Healthy Choice, Vernell's, Estefany's, etc    Drink at least 64 oz of water each day     Take a multivitamin daily     Walk 30 min every day    Start Wegovy by taking 0.25 mg once each week for four weeks, then increase to 0.5 mg once each weeks; at

## 2023-08-03 NOTE — PROGRESS NOTES
CC -   CALOS, Obesity    BACKGROUND -   First visit: 08/03/23    Obesity   Began in 30's  Initial BMI 77.2, Wt 439.2 lbs, Ht 5' 3.25\"  HS Grad wt 135 lbs  Lowest   wt 135 lbs   Highest  wt 445 lbs  Pattern of wt gain: grad with rapid increases during courses of prednisone  Wt change past yr: +25 lbs  Most wt lost: 62 lbs [facility])  Other diets attempted: See by Dr. Dian Xie 03/03/22 (prescribed phentermine, but did not take), Keto, [facility]-supervised fast, Golo, Lypozeme, Cindy Bumpers, [address] Diet, [employer], SF, NS, Home delivered wt loss meals, dietician, [place] [facility]    Desire to lose weight: 10/10  Problem posed by appetite: 8/10    Initial Diet:    Number of meals per day - 1    Number of snacks per day - 1-3    Meal volume - 9\" plate, no seconds    Fast food/convenience store - 0x/week    Restaurants (not fast food) - 3-4x/week   Sweets - 3-4d/week (1-2 [place] cinnamon coffee cake 170 diya)   Chips - 0d/week   Crackers/pretzels - 0d/week   Nuts - 0d/week   Peanut Butter - 4d/week (2 Tbsp on a banana)   Popcorn - 0d/week   Dried fruit - 0d/week   Whole fruit - 5d/week (one - three serving)   Breakfast cereal - 0d/week   Granola/Protein/Energy bar - 0d/week   Sugar sweetened beverages - two 12 oz reg root beer (160 diya each)/wk   Protein - No supplements   Fiber - No supplements     Exercise:    Gym membership - none    Walking - none    Running - none    Resistance - none    Aerobic class - none    ______________________    [facility] -  Past Medical History:   Diagnosis Date    Arthritis     Asthma     Chronic fatigue     Class 3 severe obesity due to excess calories with serious comorbidity and body mass index (BMI) greater than or equal to 70 in adult [facility])     COVID-19 05/2022    RENETTA (generalized anxiety disorder)     GERD (gastroesophageal reflux disease)     O2 dependent     2 liters nc continuous    ACLOS on CPAP     Retained myringotomy tube in right ear     For OR 2/1/23     Current Outpatient Medications

## 2023-08-31 ENCOUNTER — SCHEDULED TELEPHONE ENCOUNTER (OUTPATIENT)
Dept: NEUROLOGY | Age: 63
End: 2023-08-31
Payer: COMMERCIAL

## 2023-08-31 DIAGNOSIS — U09.9 COVID-19 LONG HAULER: ICD-10-CM

## 2023-08-31 DIAGNOSIS — G31.84 MCI (MILD COGNITIVE IMPAIRMENT): Primary | ICD-10-CM

## 2023-08-31 PROCEDURE — 99441 PR PHYS/QHP TELEPHONE EVALUATION 5-10 MIN: CPT | Performed by: PHYSICIAN ASSISTANT

## 2023-08-31 RX ORDER — PREDNISONE 10 MG/1
TABLET ORAL
COMMUNITY
Start: 2023-08-28

## 2023-08-31 RX ORDER — DOXYCYCLINE HYCLATE 100 MG/1
100 CAPSULE ORAL 2 TIMES DAILY
COMMUNITY
Start: 2023-08-28

## 2023-08-31 RX ORDER — CEFPODOXIME PROXETIL 200 MG/1
200 TABLET, FILM COATED ORAL 2 TIMES DAILY WITH MEALS
COMMUNITY
Start: 2023-08-28

## 2023-08-31 RX ORDER — DOXEPIN HYDROCHLORIDE 25 MG/1
25 CAPSULE ORAL
COMMUNITY
Start: 2023-08-15

## 2023-08-31 NOTE — PROGRESS NOTES
[id number] Cristy Arambula M.D., F.A.C.P. Jennyfer Sheriff, EDDA, APRN, CNS  Gus Setting. Karishma Mcnamara, MSN, APRN-FNP-C  Feliz Gonzalez MSN, APRN, FNP-C  Patti Bumpers MSPAS, PA-C  [facility] MSN, APRN, FNP-C  [address], [address] [place] [address]  Phone: [phone]  Fax: [phone]           The patient and/or health care decision maker is aware that that he/she may receive a bill for this telephone service, depending on his/her insurance coverage, and has provided verbal consent to proceed: Yes     I affirm this is a Patient Initiated Episode with an Established Patient who has not had a related appointment within my department in the past 7 days or scheduled within the next 24 hours. The patient's identity was verified at the start of the call. Others involved in call: NA    Patient location: Home, [place]   Provider location: Office, [place]      Total Time: minutes: 5-10 minutes    Note: not billable if this call serves to triage the patient into an appointment for the relevant concern    HPI:     She is following up for [facility]. Currently being treated for PNA. Memory has been stable. Feels cognitive therapy has been beneficial. Working from home has helped as well- completing tasks when she feels she is cognitively able to. Biggest problem she reports is having \"daily glitches\" and feels she \"spaces out\" when being over stimulated. These are the things she wants to work on next with therapy. Continues to tolerate Aricept and Namenda well. Otherwise, no questions ro concerns today       No chest pain or palpitations  No SOB  No vertigo, lightheadedness or loss of consciousness  No falls, tripping or stumbling  No incontinence of bowels or bladder  No itching or bruising   No numbness, tingling or focal arm/leg weakness    ROS otherwise negative      Medications:     Prior to Admission medications    Medication Sig Start Date End Date Taking?

## 2023-10-17 ENCOUNTER — OFFICE VISIT (OUTPATIENT)
Dept: ORTHOPEDIC SURGERY | Age: 63
End: 2023-10-17
Payer: MEDICARE

## 2023-10-17 VITALS — BODY MASS INDEX: 48.82 KG/M2 | HEIGHT: 65 IN | WEIGHT: 293 LBS

## 2023-10-17 DIAGNOSIS — K40.90 UNILATERAL INGUINAL HERNIA WITHOUT OBSTRUCTION OR GANGRENE, RECURRENCE NOT SPECIFIED: ICD-10-CM

## 2023-10-17 DIAGNOSIS — M25.552 PAIN OF LEFT HIP: ICD-10-CM

## 2023-10-17 DIAGNOSIS — M17.12 PRIMARY OSTEOARTHRITIS OF LEFT KNEE: Primary | ICD-10-CM

## 2023-10-17 DIAGNOSIS — M25.562 ACUTE PAIN OF LEFT KNEE: ICD-10-CM

## 2023-10-17 PROCEDURE — G8400 PT W/DXA NO RESULTS DOC: HCPCS | Performed by: PHYSICIAN ASSISTANT

## 2023-10-17 PROCEDURE — G8484 FLU IMMUNIZE NO ADMIN: HCPCS | Performed by: PHYSICIAN ASSISTANT

## 2023-10-17 PROCEDURE — 1123F ACP DISCUSS/DSCN MKR DOCD: CPT | Performed by: PHYSICIAN ASSISTANT

## 2023-10-17 PROCEDURE — 1090F PRES/ABSN URINE INCON ASSESS: CPT | Performed by: PHYSICIAN ASSISTANT

## 2023-10-17 PROCEDURE — 99203 OFFICE O/P NEW LOW 30 MIN: CPT | Performed by: PHYSICIAN ASSISTANT

## 2023-10-17 PROCEDURE — G8417 CALC BMI ABV UP PARAM F/U: HCPCS | Performed by: PHYSICIAN ASSISTANT

## 2023-10-17 PROCEDURE — G8427 DOCREV CUR MEDS BY ELIG CLIN: HCPCS | Performed by: PHYSICIAN ASSISTANT

## 2023-10-17 PROCEDURE — 1036F TOBACCO NON-USER: CPT | Performed by: PHYSICIAN ASSISTANT

## 2023-10-17 PROCEDURE — 3017F COLORECTAL CA SCREEN DOC REV: CPT | Performed by: PHYSICIAN ASSISTANT

## 2023-10-17 RX ORDER — MELOXICAM 15 MG/1
15 TABLET ORAL DAILY
Qty: 30 TABLET | Refills: 0 | Status: SHIPPED | OUTPATIENT
Start: 2023-10-17

## 2023-10-17 NOTE — PROGRESS NOTES
[facility]  New Patient Note      CHIEF COMPLAINT:   Chief Complaint   Patient presents with    Leg Pain     Pt presents this PM with c/o pain in her L knee/LE. States that she has noticed pain since falling 2 weeks ago on 10/8. Leg slid out from under her. Pain is in L hip and knee. Pain in hip is in groinand posterior hip, and is constant. Pain surrounds the kneecap, and she has recently started noticing clicking. Has taken Advil for pain. HISTORY OF PRESENT ILLNESS:                The patient is a 70 y.o. female who presents today with complaints of left knee and hip pain that began 2 weeks ago when she slipped while trying to lift her mother who had fallen. At the time, she did not want to fall on mom so her left leg abducted and she fell onto the right side. She localizes her pain to the groin and anterior knee. She denies any numbness, tingling, loss of sensation or radiation of symptoms into toes. Pain in the groin is worse with sitting, rolling over in bed, valsalva, hip flexion. Pain in the knee is worse with walking, has noted clicking. She has been using advil dual action for symptomatic relief.   She has never injured her knee or hip in the past.        Past Medical History:        Diagnosis Date    Arthritis     Asthma     Chronic fatigue     Class 3 severe obesity due to excess calories with serious comorbidity and body mass index (BMI) greater than or equal to 70 in adult [facility])     COVID-19 05/2022    RENETTA (generalized anxiety disorder)     GERD (gastroesophageal reflux disease)     O2 dependent     2 liters nc continuous    CALOS on CPAP     Retained myringotomy tube in right ear     For OR 2/1/23     Past Surgical History:        Procedure Laterality Date    APPENDECTOMY      CHOLECYSTECTOMY      COLONOSCOPY N/A [facility]13[facility]    [facility]ILATION AND CURETTAGE OF UTERUS      [facility]    ENDOSCOPY, COLON, DIAGNOSTIC      HYSTEROSCOPY  01/12/2017    [facility]    INNER EAR

## 2023-10-17 NOTE — PATIENT INSTRUCTIONS
*At this time I have recommended starting an anti-inflammatory, Mobic 15mg 1 tablet by mouth daily as needed for pain, with GI precautions. If patient would develop any GI upset, nausea, vomiting, change in appetite, blood in stools he should discontinue the medication and contact our office immediately. They are also aware that he should not take any other over-the-counter anti-inflammatories while on this. They can use Tylenol 500 mg 2 tablets every 8 hours as needed for pain in addition to the prescription anti-inflammatory provided with the visit today.

## 2023-11-07 ENCOUNTER — TELEPHONE (OUTPATIENT)
Dept: SURGERY | Age: 63
End: 2023-11-07

## 2023-11-07 NOTE — TELEPHONE ENCOUNTER
Patient cancelling 11/8/23 new patient appointment for Left  inguinal hernia, Fracisco Dunham. Patient had death in family.  Please advise patient for rescheduling recommendations [phone]

## 2023-11-08 NOTE — TELEPHONE ENCOUNTER
Patient rescheduled with Dr Fay Steven 11/15 in [place].     Electronically signed by Matt Hernandez MA on 11/8/2023 at 10:25 AM

## 2024-05-01 ENCOUNTER — OFFICE VISIT (OUTPATIENT)
Dept: NEUROLOGY | Age: 64
End: 2024-05-01
Payer: MEDICARE

## 2024-05-01 VITALS
DIASTOLIC BLOOD PRESSURE: 89 MMHG | WEIGHT: 293 LBS | BODY MASS INDEX: 48.82 KG/M2 | SYSTOLIC BLOOD PRESSURE: 137 MMHG | OXYGEN SATURATION: 92 % | HEIGHT: 65 IN | HEART RATE: 86 BPM

## 2024-05-01 DIAGNOSIS — G31.84 MCI (MILD COGNITIVE IMPAIRMENT): Primary | ICD-10-CM

## 2024-05-01 DIAGNOSIS — U09.9 COVID-19 LONG HAULER: ICD-10-CM

## 2024-05-01 DIAGNOSIS — R29.818 TRANSIENT NEUROLOGIC DEFICIT: ICD-10-CM

## 2024-05-01 PROCEDURE — G8400 PT W/DXA NO RESULTS DOC: HCPCS | Performed by: PHYSICIAN ASSISTANT

## 2024-05-01 PROCEDURE — 99214 OFFICE O/P EST MOD 30 MIN: CPT | Performed by: PHYSICIAN ASSISTANT

## 2024-05-01 PROCEDURE — G8417 CALC BMI ABV UP PARAM F/U: HCPCS | Performed by: PHYSICIAN ASSISTANT

## 2024-05-01 PROCEDURE — 1090F PRES/ABSN URINE INCON ASSESS: CPT | Performed by: PHYSICIAN ASSISTANT

## 2024-05-01 PROCEDURE — 1123F ACP DISCUSS/DSCN MKR DOCD: CPT | Performed by: PHYSICIAN ASSISTANT

## 2024-05-01 PROCEDURE — 1036F TOBACCO NON-USER: CPT | Performed by: PHYSICIAN ASSISTANT

## 2024-05-01 PROCEDURE — G8427 DOCREV CUR MEDS BY ELIG CLIN: HCPCS | Performed by: PHYSICIAN ASSISTANT

## 2024-05-01 PROCEDURE — 3017F COLORECTAL CA SCREEN DOC REV: CPT | Performed by: PHYSICIAN ASSISTANT

## 2024-05-01 RX ORDER — DONEPEZIL HYDROCHLORIDE 10 MG/1
5 TABLET, FILM COATED ORAL NIGHTLY
Qty: 15 TABLET | Refills: 2
Start: 2024-05-01 | End: 2024-07-30

## 2024-05-01 RX ORDER — CLONAZEPAM 1 MG/1
1 TABLET ORAL NIGHTLY PRN
COMMUNITY

## 2024-05-01 RX ORDER — FLUVOXAMINE MALEATE 50 MG/1
50 TABLET, COATED ORAL NIGHTLY
COMMUNITY
Start: 2024-04-24

## 2024-05-01 RX ORDER — MULTIVIT-MIN/IRON/FOLIC ACID/K 18-600-40
2000 CAPSULE ORAL DAILY
COMMUNITY

## 2024-05-01 NOTE — PROGRESS NOTES
on March 6.    8/2023 OV   Memory has been stable. Feels cognitive therapy has been beneficial. Working from home has helped as well- completing tasks when she feels she is cognitively able to. Biggest problem she reports is having \"daily glitches\" and feels she \"spaces out\" when being over stimulated. These are the things she wants to work on next with therapy.     5/2024 OV  Feels she was doing fairly well since her last visit up until about 2 to 3 months ago when she developed pneumonia.  She also reports over the last couple months has had adjustments in her medications for her mental health and reports increased anxiety.  She reports symptoms of \"time lapses\" where she loses a period of time and states that it can be for minutes to hours.  She does not remember this time.  She also reports a couple near misses while driving stating that she looks both ways a couple times and does not see a car and then all of a sudden a cars in front of her.  She denies any loss of consciousness or abnormal movements.  She continues to be faithful with her oxygen and CPAP.    Review of Systems:  + vision disturbance  + memory impairment  + dizziness     Allergies:      Allergies   Allergen Reactions    Latex Anaphylaxis, Other (See Comments) and Shortness Of Breath     Other reaction(s): Unknown    Azithromycin Hallucinations    Hylan G-F 20 Other (See Comments)     SYNVISC CAUSED PANCREATITIS    Adhesive Tape Other (See Comments)     Tears skin off    Gabapentin Diarrhea     constant diarrhea    Penicillins Other (See Comments) and Rash     As child    Sulfa Antibiotics Diarrhea and Other (See Comments)     Other reaction(s): Not available        Physical Examination  Vitals   Vitals:    05/01/24 1531   BP: 137/89   Site: Left Lower Arm   Position: Sitting   Cuff Size: Medium Adult   Pulse: 86   SpO2: 92%   Weight: (!) 201.4 kg (444 lb)   Height: 1.651 m (5' 5\")          General: Patient appears in no acute distress. Obese

## 2024-06-21 ENCOUNTER — PATIENT MESSAGE (OUTPATIENT)
Dept: NEUROLOGY | Age: 64
End: 2024-06-21

## 2024-06-21 NOTE — TELEPHONE ENCOUNTER
From: Elvia Bang  To: Thu Conrad  Sent: 6/21/2024 9:12 AM EDT  Subject: Headache/72 hour EEG    Since the day they removed the 72 hour (which was 84 hours because of doing test over weekend) I have had a bad headache. Could this have been a result of the test?  Other question is do you have the results and will you or someone call me to go over the results?   Thank you  Elvia Bang

## 2024-09-04 ENCOUNTER — SCHEDULED TELEPHONE ENCOUNTER (OUTPATIENT)
Dept: NEUROLOGY | Age: 64
End: 2024-09-04

## 2024-09-04 DIAGNOSIS — H53.2 DIPLOPIA: Primary | ICD-10-CM

## 2024-09-04 DIAGNOSIS — G44.52 NEW DAILY PERSISTENT HEADACHE: ICD-10-CM

## 2024-09-04 RX ORDER — PROMETHAZINE HYDROCHLORIDE 25 MG/1
25 TABLET ORAL DAILY
Qty: 6 TABLET | Refills: 0 | Status: SHIPPED | OUTPATIENT
Start: 2024-09-04 | End: 2024-09-10

## 2024-09-04 RX ORDER — PREDNISONE 20 MG/1
TABLET ORAL
Qty: 12 TABLET | Refills: 0 | Status: SHIPPED | OUTPATIENT
Start: 2024-09-04 | End: 2024-09-09

## 2024-09-04 RX ORDER — TOPIRAMATE 25 MG/1
TABLET, FILM COATED ORAL
Qty: 42 TABLET | Refills: 0 | Status: SHIPPED | OUTPATIENT
Start: 2024-09-04 | End: 2024-10-02

## 2024-09-04 NOTE — PROGRESS NOTES
[facility]  NEUROLOGY  Thu ROSA PA-C  Phone: [phone]  Fax: [phone]     The patient and/or health care decision maker is aware that that he/she may receive a bill for this telephone service, depending on his/her insurance coverage, and has provided verbal consent to proceed: Yes     I affirm this is a Patient Initiated Episode with an Established Patient who has not had a related appointment within my department in the past 7 days or scheduled within the next 24 hours.     The patient's identity was verified at the start of the call. Others involved in call: NA    Patient location: Home, [place]   Provider location: Office, [place]      Total Time: 24 minutes     Note: not billable if this call serves to triage the patient into an appointment for the relevant concern    HPI:     Patient typically follows for [facility].  Last visit she has been complaining of episodes where she was \"spacing out\" and having \"time lapses\".  Due to this a 72-hour EEG was completed which the results have not been received yet.  Following the EEG while being disconnected patient had called in stating that she had developed new onset headaches.  She was sent over a couple days of steroids and advised to return call if headaches continued.  I have not heard anything since that time.    She reports today that headaches have continued.  They are right sided, unilateral with significant pressure and stabbing pains.  Associated photophobia and nausea as well.  They are occurring approximately 3 times per week and can last all day into the next day.  Over-the-counter analgesics have had no significant reduction.  PCP did obtain MRI of the brain presumably with contrast as patient states that she had an IV placed prior to the scan.  Per report this was normal, though I do not have the report or images to personally review.  She states that the headaches began the day the EEG was disconnected which was some time around 6/10/2024.

## 2024-09-06 ENCOUNTER — TELEPHONE (OUTPATIENT)
Dept: NEUROLOGY | Age: 64
End: 2024-09-06

## 2024-09-06 NOTE — TELEPHONE ENCOUNTER
----- Message from HONG Landry sent at 9/5/2024 11:01 AM EDT -----  Regarding: EEG results  Please inform patient that EEG was normal. Thank you!  ----- Message -----  From: Agnes Stephen MA  Sent: 9/5/2024  10:48 AM EDT  To: HONG Landry

## 2024-10-07 DIAGNOSIS — H53.2 DIPLOPIA: ICD-10-CM

## 2024-10-07 LAB
C-REACTIVE PROTEIN: 13 MG/L (ref 0–5)
SED RATE, AUTOMATED: 21 MM/HR (ref 0–20)

## 2024-10-08 ENCOUNTER — HOSPITAL ENCOUNTER (OUTPATIENT)
Dept: CT IMAGING | Age: 64
Discharge: HOME | End: 2024-10-10
Payer: MEDICARE

## 2024-10-08 DIAGNOSIS — H53.2 DIPLOPIA: ICD-10-CM

## 2024-10-08 PROCEDURE — 70498 CT ANGIOGRAPHY NECK: CPT

## 2024-10-08 PROCEDURE — 70496 CT ANGIOGRAPHY HEAD: CPT

## 2024-10-08 PROCEDURE — 6360000004 HC RX CONTRAST MEDICATION: Performed by: RADIOLOGY

## 2024-10-08 RX ORDER — IOPAMIDOL 755 MG/ML
75 INJECTION, SOLUTION INTRAVASCULAR
Status: COMPLETED | OUTPATIENT
Start: 2024-10-08 | End: 2024-10-08

## 2024-10-08 RX ADMIN — IOPAMIDOL 75 ML: 755 INJECTION, SOLUTION INTRAVENOUS at 16:33

## 2024-10-10 ENCOUNTER — TELEPHONE (OUTPATIENT)
Dept: NEUROLOGY | Age: 64
End: 2024-10-10

## 2024-10-10 LAB — ACETYLCHOLINE BINDING ANTIBODY: 0 NMOL/L (ref 0–0.4)

## 2024-10-10 NOTE — TELEPHONE ENCOUNTER
----- Message from HONG Frey sent at 10/9/2024 12:46 PM EDT -----  Regarding: results  Please inform patient that I have reviewed her CTAs/CTV. I have reached out to Dr. Aldana to review for further recommendations and we will let her know when he gets back to me. Also-- I still need copy of MRI brain that she had done at [facility] on 8/16/24. Thank you!  ----- Message -----  From: Rey Garcia Incoming Radiant Results From [facility]  Sent: 10/8/2024  11:30 PM EDT  To: HONG Frey

## 2024-12-26 ENCOUNTER — OFFICE VISIT (OUTPATIENT)
Dept: FAMILY MEDICINE CLINIC | Age: 64
End: 2024-12-26

## 2024-12-26 ENCOUNTER — TELEPHONE (OUTPATIENT)
Dept: FAMILY MEDICINE CLINIC | Age: 64
End: 2024-12-26

## 2024-12-26 VITALS
OXYGEN SATURATION: 96 % | SYSTOLIC BLOOD PRESSURE: 138 MMHG | DIASTOLIC BLOOD PRESSURE: 88 MMHG | HEART RATE: 89 BPM | HEIGHT: 66 IN | BODY MASS INDEX: 68.6 KG/M2 | RESPIRATION RATE: 22 BRPM | TEMPERATURE: 97.8 F

## 2024-12-26 DIAGNOSIS — M25.472 PAIN AND SWELLING OF LEFT ANKLE: ICD-10-CM

## 2024-12-26 DIAGNOSIS — M25.562 ACUTE PAIN OF LEFT KNEE: ICD-10-CM

## 2024-12-26 DIAGNOSIS — M25.572 PAIN AND SWELLING OF LEFT ANKLE: ICD-10-CM
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
## 2024-12-26 RX ORDER — NAPROXEN 500 MG/1
500 TABLET ORAL 2 TIMES DAILY PRN
Qty: 15 TABLET | Refills: 0 | Status: SHIPPED | OUTPATIENT
Start: 2024-12-26

## 2024-12-26 ASSESSMENT — ENCOUNTER SYMPTOMS
SHORTNESS OF BREATH: 0
COLOR CHANGE: 0
ABDOMINAL PAIN: 0
DIARRHEA: 0
NAUSEA: 0
VOMITING: 0

## 2024-12-26 NOTE — TELEPHONE ENCOUNTER
Please notify patient that knee x-ray showing soft tissue swelling and arthritic changes.  Ankle showing soft tissue swelling.  No fractures noted to either.

## 2024-12-27 NOTE — PROGRESS NOTES
to 68 in adult     COVID-19 05/2022    RENETTA (generalized anxiety disorder)     GERD (gastroesophageal reflux disease)     O2 dependent     2 liters nc continuous    CALOS on CPAP     Retained myringotomy tube in right ear     For OR 2/1/23     Past Surgical History:   Procedure Laterality Date    APPENDECTOMY      CHOLECYSTECTOMY      [facility]    [facility] AND CURETTAGE OF UTERUS      [facility]    ENDOSCOPY, COLON, DIAGNOSTIC      HYSTEROSCOPY  01/12/2017    [facility]    INNER EAR SURGERY Right 2/1/2023    RIGHT MIDDLE EAR EXPLORATION WITH PATCH performed by Candelario Schmidt DO at [facility] OR    MYRINGOTOMY AND TYMPANOSTOMY TUBE PLACEMENT      PILONIDAL CYST EXCISION N/A     SKIN BIOPSY      breast     Family History   Problem Relation Age of Onset    Hypertension Mother     Stroke Mother     Cancer Father         throat, colon, stomach    Heart Disease Father     Atrial Fibrillation Father     Hypertension Sister     Prostate Cancer Brother      Social History     Socioeconomic History    Marital status: Single     Spouse name: Not on file    Number of children: Not on file    Years of education: Not on file    Highest education level: Not on file   Occupational History    Occupation: [employer]- [employer] business   Tobacco Use    Smoking status: Never    Smokeless tobacco: Never   Vaping Use    Vaping status: Never Used   Substance and Sexual Activity    Alcohol use: Yes     Comment: rare    Drug use: Never    Sexual activity: Not Currently   Other Topics Concern    Not on file   Social History Narrative    Not on file     Social Determinants of Health     Financial Resource Strain: Not on file   Food Insecurity: Not on file   Transportation Needs: Not on file   Physical Activity: Not on file   Stress: Not on file   Social Connections: Not on file   Intimate Partner Violence: Not on file   Housing Stability: Not on file       Vitals:    12/26/24 1100 12/26/24 1108   BP: (!) 154/90 138/88

## (undated) DEVICE — PAPER PATCH

## (undated) DEVICE — ADHESIVE SKIN CLSR 0.7ML TOP DERMBND ADV

## (undated) DEVICE — TUBING, SUCTION, 3/16" X 12', STRAIGHT: Brand: MEDLINE

## (undated) DEVICE — TOWEL,OR,DSP,ST,BLUE,STD,6/PK,12PK/CS: Brand: MEDLINE

## (undated) DEVICE — DRAPE,REIN 53X77,STERILE: Brand: MEDLINE

## (undated) DEVICE — Z INACTIVE USE 2855131 SPONGE GZ W4XL4IN RAYON POLY FILL CVR W/ NONWOVEN FAB

## (undated) DEVICE — MARKER,SKIN,WI/RULER AND LABELS: Brand: MEDLINE

## (undated) DEVICE — SET SURG BASIN MAYO REUSABLE

## (undated) DEVICE — GLOVE ORANGE PI 8 1/2   MSG9085

## (undated) DEVICE — 4-PORT MANIFOLD: Brand: NEPTUNE 2